# Patient Record
Sex: FEMALE | Race: WHITE | URBAN - METROPOLITAN AREA
[De-identification: names, ages, dates, MRNs, and addresses within clinical notes are randomized per-mention and may not be internally consistent; named-entity substitution may affect disease eponyms.]

---
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
## 2021-09-21 ENCOUNTER — LAB (OUTPATIENT)
Dept: LAB | Facility: HOSPITAL | Age: 21
End: 2021-09-21

## 2021-09-21 ENCOUNTER — OFFICE VISIT (OUTPATIENT)
Dept: FAMILY MEDICINE CLINIC | Facility: CLINIC | Age: 21
End: 2021-09-21

## 2021-09-21 VITALS
HEIGHT: 66 IN | SYSTOLIC BLOOD PRESSURE: 122 MMHG | OXYGEN SATURATION: 99 % | DIASTOLIC BLOOD PRESSURE: 72 MMHG | BODY MASS INDEX: 20.25 KG/M2 | HEART RATE: 82 BPM | WEIGHT: 126 LBS | TEMPERATURE: 98.6 F

## 2021-09-21 DIAGNOSIS — F41.1 ANXIETY STATE: ICD-10-CM

## 2021-09-21 DIAGNOSIS — D64.9 ANEMIA, UNSPECIFIED TYPE: ICD-10-CM

## 2021-09-21 DIAGNOSIS — R07.9 CHEST PAIN, UNSPECIFIED TYPE: ICD-10-CM

## 2021-09-21 DIAGNOSIS — K59.00 CONSTIPATION, UNSPECIFIED CONSTIPATION TYPE: ICD-10-CM

## 2021-09-21 DIAGNOSIS — R10.9 ABDOMINAL CRAMPING: Primary | ICD-10-CM

## 2021-09-21 DIAGNOSIS — Z11.59 NEED FOR HEPATITIS C SCREENING TEST: ICD-10-CM

## 2021-09-21 DIAGNOSIS — F95.2 TOURETTE SYNDROME: ICD-10-CM

## 2021-09-21 DIAGNOSIS — R13.10 DYSPHAGIA, UNSPECIFIED TYPE: ICD-10-CM

## 2021-09-21 DIAGNOSIS — R39.15 URINARY URGENCY: ICD-10-CM

## 2021-09-21 DIAGNOSIS — F42.9 OBSESSIVE-COMPULSIVE DISORDER, UNSPECIFIED TYPE: ICD-10-CM

## 2021-09-21 PROBLEM — Q67.6 PECTUS EXCAVATUM: Status: ACTIVE | Noted: 2020-09-14

## 2021-09-21 PROBLEM — Q79.60 EHLERS-DANLOS SYNDROME: Status: ACTIVE | Noted: 2021-09-21

## 2021-09-21 LAB
ALBUMIN SERPL-MCNC: 4.9 G/DL (ref 3.5–5.2)
ALBUMIN/GLOB SERPL: 2.3 G/DL
ALP SERPL-CCNC: 45 U/L (ref 39–117)
ALT SERPL W P-5'-P-CCNC: 7 U/L (ref 1–33)
ANION GAP SERPL CALCULATED.3IONS-SCNC: 8.1 MMOL/L (ref 5–15)
AST SERPL-CCNC: 13 U/L (ref 1–32)
B-HCG UR QL: NEGATIVE
BASOPHILS # BLD AUTO: 0.02 10*3/MM3 (ref 0–0.2)
BASOPHILS NFR BLD AUTO: 0.3 % (ref 0–1.5)
BILIRUB BLD-MCNC: NEGATIVE MG/DL
BILIRUB SERPL-MCNC: 0.5 MG/DL (ref 0–1.2)
BUN SERPL-MCNC: 6 MG/DL (ref 6–20)
BUN/CREAT SERPL: 15 (ref 7–25)
CALCIUM SPEC-SCNC: 9.3 MG/DL (ref 8.6–10.5)
CHLORIDE SERPL-SCNC: 102 MMOL/L (ref 98–107)
CLARITY, POC: CLEAR
CO2 SERPL-SCNC: 26.9 MMOL/L (ref 22–29)
COLOR UR: YELLOW
CREAT SERPL-MCNC: 0.4 MG/DL (ref 0.57–1)
DEPRECATED RDW RBC AUTO: 39.1 FL (ref 37–54)
EOSINOPHIL # BLD AUTO: 0.03 10*3/MM3 (ref 0–0.4)
EOSINOPHIL NFR BLD AUTO: 0.5 % (ref 0.3–6.2)
ERYTHROCYTE [DISTWIDTH] IN BLOOD BY AUTOMATED COUNT: 11.8 % (ref 12.3–15.4)
GFR SERPL CREATININE-BSD FRML MDRD: >150 ML/MIN/1.73
GLOBULIN UR ELPH-MCNC: 2.1 GM/DL
GLUCOSE SERPL-MCNC: 87 MG/DL (ref 65–99)
GLUCOSE UR STRIP-MCNC: NEGATIVE MG/DL
HCT VFR BLD AUTO: 34.5 % (ref 34–46.6)
HGB BLD-MCNC: 11.8 G/DL (ref 12–15.9)
IMM GRANULOCYTES # BLD AUTO: 0.02 10*3/MM3 (ref 0–0.05)
IMM GRANULOCYTES NFR BLD AUTO: 0.3 % (ref 0–0.5)
INTERNAL NEGATIVE CONTROL: NEGATIVE
INTERNAL POSITIVE CONTROL: POSITIVE
KETONES UR QL: NEGATIVE
LEUKOCYTE EST, POC: NEGATIVE
LYMPHOCYTES # BLD AUTO: 2.59 10*3/MM3 (ref 0.7–3.1)
LYMPHOCYTES NFR BLD AUTO: 43.1 % (ref 19.6–45.3)
Lab: NORMAL
MCH RBC QN AUTO: 31.5 PG (ref 26.6–33)
MCHC RBC AUTO-ENTMCNC: 34.2 G/DL (ref 31.5–35.7)
MCV RBC AUTO: 92 FL (ref 79–97)
MONOCYTES # BLD AUTO: 0.37 10*3/MM3 (ref 0.1–0.9)
MONOCYTES NFR BLD AUTO: 6.2 % (ref 5–12)
NEUTROPHILS NFR BLD AUTO: 2.98 10*3/MM3 (ref 1.7–7)
NEUTROPHILS NFR BLD AUTO: 49.6 % (ref 42.7–76)
NITRITE UR-MCNC: NEGATIVE MG/ML
NRBC BLD AUTO-RTO: 0 /100 WBC (ref 0–0.2)
PH UR: 7 [PH] (ref 5–8)
PLATELET # BLD AUTO: 242 10*3/MM3 (ref 140–450)
PMV BLD AUTO: 11.1 FL (ref 6–12)
POTASSIUM SERPL-SCNC: 4.2 MMOL/L (ref 3.5–5.2)
PROT SERPL-MCNC: 7 G/DL (ref 6–8.5)
PROT UR STRIP-MCNC: NEGATIVE MG/DL
RBC # BLD AUTO: 3.75 10*6/MM3 (ref 3.77–5.28)
RBC # UR STRIP: NEGATIVE /UL
SODIUM SERPL-SCNC: 137 MMOL/L (ref 136–145)
SP GR UR: 1.02 (ref 1–1.03)
TSH SERPL DL<=0.05 MIU/L-ACNC: 1.01 UIU/ML (ref 0.27–4.2)
UROBILINOGEN UR QL: NORMAL
WBC # BLD AUTO: 6.01 10*3/MM3 (ref 3.4–10.8)

## 2021-09-21 PROCEDURE — 83540 ASSAY OF IRON: CPT

## 2021-09-21 PROCEDURE — 84466 ASSAY OF TRANSFERRIN: CPT

## 2021-09-21 PROCEDURE — 82306 VITAMIN D 25 HYDROXY: CPT

## 2021-09-21 PROCEDURE — 86803 HEPATITIS C AB TEST: CPT

## 2021-09-21 PROCEDURE — 81025 URINE PREGNANCY TEST: CPT | Performed by: NURSE PRACTITIONER

## 2021-09-21 PROCEDURE — 84443 ASSAY THYROID STIM HORMONE: CPT

## 2021-09-21 PROCEDURE — 85025 COMPLETE CBC W/AUTO DIFF WBC: CPT

## 2021-09-21 PROCEDURE — 93000 ELECTROCARDIOGRAM COMPLETE: CPT | Performed by: NURSE PRACTITIONER

## 2021-09-21 PROCEDURE — 99204 OFFICE O/P NEW MOD 45 MIN: CPT | Performed by: NURSE PRACTITIONER

## 2021-09-21 PROCEDURE — 81003 URINALYSIS AUTO W/O SCOPE: CPT | Performed by: NURSE PRACTITIONER

## 2021-09-21 PROCEDURE — 80053 COMPREHEN METABOLIC PANEL: CPT

## 2021-09-21 RX ORDER — POLYETHYLENE GLYCOL 3350 17 G/17G
17 POWDER, FOR SOLUTION ORAL DAILY
Qty: 850 G | Refills: 2 | Status: SHIPPED | OUTPATIENT
Start: 2021-09-21 | End: 2021-11-01

## 2021-09-21 NOTE — PROGRESS NOTES
Marilee Cooper presents today to establish care.    Establish Care, Chest Pain (Pt states she gets sharp chest pains about 1-2 times a day.), Abdominal Cramping (Pt states she has had some abdominal cramping for a few weeks.), and Urinary Urgency       HPI   Pt presents today to establish care.  She was getting her care at [facility].  She hasn't been seen in about 1 year.  She went last August, because she pulled a muscle at work.      She has had chest pain since she was a child.  She has been getting a twinge on both sides of her manubrium.  She also gets pain between her shoulder blades.  It isn't everyday or consistent.  It just flares up.  She started noticing it about 3 weeks ago.  It happens regardless of what she is doing.  It lasts for about 30 minutes, but then goes away.  She denies any other symptoms accompanying it.  It does make her feel anxious.  She denies radiation of the pain anywhere else, SOB, sweating.  She has scoliosis and doesn't know if this could be contributing.      She has been having abd cramping for a few weeks.  She has had constipation, bloating, and gas for a while.  She constantly feels like she needs to go to the bathroom.  It gets worse after sex.  She states that online she had read that a tilted pelvis could cause this.  She feels like there is hair in her uterus.  Sometimes it feels like period cramps, but it isn't.  She hasn't taken any medication for this.  She will poop and the cramping will improve a little, but then it comes right back.  She has to strain hard to have a BM.  Sometimes she will wipe and have blood on the tissue.  She hasn't felt anything sticking out of her bottom.  Sometimes it will feel itchy and burning.  Sometimes her stool is dark.  It will be soft, close to diarrhea, but not.  Typically her stool is yellow in color.  Her periods are a little abnormal, but have been consistent.  She had a mix between anorexia and bulimia for a  while.  She will be in the process of eating, but when she starts to swallow she will have trouble.  She will start gagging as she is trying to swallow.  She almost threw up at dinner one time.  She has never had this issue before.  In May is when her eating disorder improved.      Recently she has been having to use the bathroom a lot more.  She sometimes feels like her bladder empties completely, but then sometimes she has to push a bit.  She denies itching, stinging, burning, foul odor, dark urine.     She was diagnosed with Tourette's, OCD, and anxiety when she was 6.  She stopped the medication, her sophomore year of college d/t nausea.  She feels like she is controlling it without medication.  She is able to most of the time calm herself down.    She thinks she has a 1 year f/u for her chest surgery in December.  She had her bars taken out last December.    She doesn't have to see a specialist for her Ehler's Danlos.    She is scheduled for a pap smear in November.     She feels like she might be disassociating for the past month.  It is like she isn't there for short periods of time.  She can't recognize when it happens.  She has no h/o seizures.  She realizes that her brain is going down a thought process.  She sees Santos Marcos in [place].    Past Medical History:   Diagnosis Date   • Anemia    • Anxiety    • Back problem    • Bronchitis    • GERD (gastroesophageal reflux disease)    • Headache    • OCD (obsessive compulsive disorder)    • Pneumonia    • Tourette's    • Tourette's         Past Surgical History:   Procedure Laterality Date   • RECONSTRUCTION PECTUS EXCAVATUM / CARINATUM W/ OR W/O THORASCOPY     • RHINOPLASTY     • TONSILLECTOMY          Family History   Problem Relation Age of Onset   • Arthritis Mother    • Asthma Mother    • Hypothyroidism Mother    • Hypertension Father    • Cancer Maternal Grandfather         Social History     Socioeconomic History   • Marital status: Single     Spouse  "name: Not on file   • Number of children: Not on file   • Years of education: Not on file   • Highest education level: Not on file   Tobacco Use   • Smoking status: Never Smoker   • Smokeless tobacco: Never Used   Substance and Sexual Activity   • Alcohol use: Yes     Comment: social   • Drug use: Yes     Types: Marijuana     Comment: 1-2/day        Current Outpatient Medications on File Prior to Visit   Medication Sig Dispense Refill   • albuterol sulfate  (90 Base) MCG/ACT inhaler Inhale 2 puffs Every 6 (Six) Hours As Needed for Wheezing or Shortness of Air. 1 inhaler 0     No current facility-administered medications on file prior to visit.       Allergies   Allergen Reactions   • Metronidazole Nausea Only   • Augmentin [Amoxicillin-Pot Clavulanate] Rash        Vitals:    09/21/21 1421   BP: 122/72   Pulse: 82   Temp: 98.6 °F (37 °C)   SpO2: 99%   Weight: 57.2 kg (126 lb)   Height: 167.6 cm (66\")   PainSc:   5          Physical Exam  Vitals reviewed.   Constitutional:       Appearance: Normal appearance. She is normal weight.   HENT:      Head: Normocephalic and atraumatic.      Right Ear: Tympanic membrane, ear canal and external ear normal.      Left Ear: Tympanic membrane, ear canal and external ear normal.      Nose: Nose normal.      Mouth/Throat:      Mouth: Mucous membranes are moist.      Pharynx: Oropharynx is clear.   Cardiovascular:      Rate and Rhythm: Normal rate and regular rhythm.      Heart sounds: Normal heart sounds.   Pulmonary:      Effort: Pulmonary effort is normal.      Breath sounds: Normal breath sounds.   Abdominal:      General: Bowel sounds are normal.      Palpations: Abdomen is soft.      Tenderness: There is abdominal tenderness. There is no guarding or rebound.   Musculoskeletal:      Cervical back: Neck supple.   Skin:     General: Skin is warm and dry.   Neurological:      General: No focal deficit present.      Mental Status: She is alert and oriented to person, place, " and time.   Psychiatric:         Mood and Affect: Mood normal.         Behavior: Behavior normal.         Thought Content: Thought content normal.         Judgment: Judgment normal.         ECG 12 Lead    Date/Time: 9/21/2021 2:30 PM  Performed by: Kinsey Gonzalez APRN  Authorized by: Kinsey Gonzalez APRN   Comparison: not compared with previous ECG   Rhythm: sinus bradycardia  Rate: bradycardic  Conduction: conduction normal  ST Segments: ST segments normal  T Waves: T waves normal  QRS axis: normal    Clinical impression: normal ECG          Diagnoses and all orders for this visit:    1. Abdominal cramping (Primary) -likely constipation related.  Her urinalysis and urine pregnancy were negative.  -     POC Pregnancy, Urine    2. Urinary urgency -urinalysis is negative.  This could be related to constipation.  -     POC Urinalysis Dipstick, Automated    3. Anxiety state -she has a history of anxiety and was taking medication until about a year ago.  She states that currently she has been handling this well on her own.  She has been having episodes where she does zone out.  --Referral to behavioral health.  -     Ambulatory Referral to Behavioral Health  -     Vitamin D 25 Hydroxy; Future    4. Obsessive-compulsive disorder, unspecified type -she has a history of OCD which she was taking medication for previously.  She feels like she is handling this well at present.  --Referral placed to behavioral health.  -     Ambulatory Referral to Behavioral Health    5. Tourette syndrome -stable.  -     Ambulatory Referral to Behavioral Health    6. Dysphagia, unspecified type -this could be related to GERD, anxiety, dysmotility.  Patient states that she has always had issues with GERD.  Discussed with her that she can take over-the-counter Pepcid 20 mg twice a day.  --Order placed for barium swallow.  -     FL Esophagram Complete Single Contrast; Future    7. Chest pain, unspecified type -EKG is normal.  She does have a  history of pectus excavatum for which she had surgery.  It is likely that her pain could be scar tissue related.  --Referral placed to cardiology for further evaluation.  -     Ambulatory Referral to Cardiology  -     CBC & Differential; Future  -     Comprehensive Metabolic Panel; Future    8. Constipation, unspecified constipation type -her abdominal issues and urinary issues are likely related to constipation.  --Encouraged increased fiber intake.  --Start MiraLAX one capful daily.  --Ensure adequate fluid intake.  --If symptoms worsen or do not improve, return to clinic.  -     polyethylene glycol (MiraLax) 17 GM/SCOOP powder; Take 17 g by mouth Daily.  Dispense: 850 g; Refill: 2  -     TSH Rfx On Abnormal To Free T4; Future    9. Need for hepatitis C screening test  -     Hepatitis C Antibody; Future        Return in about 4 weeks (around 10/19/2021) for Follow-up.    Kinsey Gonzalez, APRN

## 2021-09-22 DIAGNOSIS — D64.9 ANEMIA, UNSPECIFIED TYPE: Primary | ICD-10-CM

## 2021-09-22 LAB
25(OH)D3 SERPL-MCNC: 34.2 NG/ML (ref 30–100)
HCV AB SER DONR QL: NORMAL
IRON 24H UR-MRATE: 137 MCG/DL (ref 37–145)
IRON SATN MFR SERPL: 33 % (ref 20–50)
TIBC SERPL-MCNC: 411 MCG/DL (ref 298–536)
TRANSFERRIN SERPL-MCNC: 276 MG/DL (ref 200–360)

## 2021-10-01 ENCOUNTER — HOSPITAL ENCOUNTER (OUTPATIENT)
Dept: GENERAL RADIOLOGY | Facility: HOSPITAL | Age: 21
Discharge: HOME | End: 2021-10-01

## 2021-10-01 DIAGNOSIS — R13.10 DYSPHAGIA, UNSPECIFIED TYPE: ICD-10-CM

## 2021-10-13 ENCOUNTER — HOSPITAL ENCOUNTER (OUTPATIENT)
Dept: GENERAL RADIOLOGY | Facility: HOSPITAL | Age: 21
Discharge: HOME | End: 2021-10-13
Admitting: NURSE PRACTITIONER

## 2021-10-13 PROCEDURE — 74220 X-RAY XM ESOPHAGUS 1CNTRST: CPT

## 2021-10-13 RX ADMIN — BARIUM SULFATE 183 ML: 960 POWDER, FOR SUSPENSION ORAL at 11:27

## 2021-10-19 ENCOUNTER — OFFICE VISIT (OUTPATIENT)
Dept: FAMILY MEDICINE CLINIC | Facility: CLINIC | Age: 21
End: 2021-10-19

## 2021-10-19 VITALS
TEMPERATURE: 97.1 F | BODY MASS INDEX: 20.73 KG/M2 | HEART RATE: 61 BPM | HEIGHT: 66 IN | WEIGHT: 129 LBS | OXYGEN SATURATION: 99 % | DIASTOLIC BLOOD PRESSURE: 80 MMHG | SYSTOLIC BLOOD PRESSURE: 130 MMHG

## 2021-10-19 DIAGNOSIS — Z23 IMMUNIZATION DUE: ICD-10-CM

## 2021-10-19 DIAGNOSIS — F41.1 ANXIETY STATE: Primary | ICD-10-CM

## 2021-10-19 DIAGNOSIS — R23.3 EASY BRUISING: ICD-10-CM

## 2021-10-19 DIAGNOSIS — Z01.20 DENTAL EXAMINATION: ICD-10-CM

## 2021-10-19 DIAGNOSIS — K59.00 CONSTIPATION, UNSPECIFIED CONSTIPATION TYPE: ICD-10-CM

## 2021-10-19 PROCEDURE — 99214 OFFICE O/P EST MOD 30 MIN: CPT | Performed by: NURSE PRACTITIONER

## 2021-10-19 PROCEDURE — 90686 IIV4 VACC NO PRSV 0.5 ML IM: CPT | Performed by: NURSE PRACTITIONER

## 2021-10-19 PROCEDURE — 90471 IMMUNIZATION ADMIN: CPT | Performed by: NURSE PRACTITIONER

## 2021-10-19 NOTE — PROGRESS NOTES
Chief Complaint  Abdominal Pain (4 week follow up, she reports that cramping has lessened and she has been moving her bowels regularly )    Evgeny Cooper presents to [facility]     Abdominal Pain  This is a chronic problem. The current episode started more than 1 year ago. The onset quality is gradual. The problem occurs daily. The most recent episode lasted 5 hours. The problem has been gradually worsening. The pain is located in the generalized abdominal region. The pain is at a severity of 6/10. The quality of the pain is aching, cramping, dull and a sensation of fullness. The abdominal pain radiates to the chest, back, pelvis and perineum. Associated symptoms include arthralgias, belching, constipation, diarrhea, flatus, frequency, headaches, melena, myalgias and nausea. Pertinent negatives include no anorexia, dysuria, fever, hematochezia, hematuria, vomiting or weight loss. The pain is aggravated by bowel movement and drinking alcohol. The pain is relieved by bowel movements, liquids, movement and passing flatus.     Answers for HPI/ROS submitted by the patient on 10/19/2021  What is the primary reason for your visit?: Abdominal Pain    Her bowel movements are more consistent.  She is having them daily.  She is having less cramping.  She is going multiple times per day.  She is still sometimes feeling like she is getting everything out.  She will occasionally have runny/loose stool.      She is still experiencing some chest and back pain.  She has seen the chiropractor and seen some improvement.  She still has a cardiology appt on 11/1.  She has had improvement in her upper back pain with the chiropractor.      Her mood has been up and down.  It has been difficult for her to control her emotions lately.  Her anxiety has been pretty high.  She has been trying to control this.  She has a Behavioral Health appt on 10/26.  There were a few times that she felt  "like she was over reacting.      She bruises easily.  She will wake up with random bruises all over her legs.  She can't remember doing anything that would cause this.    Objective   Vital Signs:   /80   Pulse 61   Temp 97.1 °F (36.2 °C)   Ht 167.6 cm (66\")   Wt 58.5 kg (129 lb)   SpO2 99%   BMI 20.82 kg/m²       Physical Exam  Vitals reviewed.   Constitutional:       Appearance: Normal appearance.   HENT:      Head: Normocephalic and atraumatic.   Cardiovascular:      Rate and Rhythm: Normal rate and regular rhythm.      Heart sounds: Normal heart sounds.   Pulmonary:      Effort: Pulmonary effort is normal.      Breath sounds: Normal breath sounds.   Skin:     General: Skin is warm and dry.   Neurological:      General: No focal deficit present.      Mental Status: She is alert and oriented to person, place, and time.   Psychiatric:         Mood and Affect: Mood normal.         Behavior: Behavior normal.         Thought Content: Thought content normal.         Judgment: Judgment normal.        Result Review :                 Assessment and Plan    Diagnoses and all orders for this visit:    1. Anxiety state (Primary) -pt does feel like this is a significant issue for her.  She does not know what kind of treatment she would like to try.  Discussed daily medication and PRN medications.  --Pt would like to think about treatment options and she will let provider know what she decides to do.  --Keep appt with Behavioral Health.    2. Constipation, unspecified constipation type -improved.    3. Easy bruising -likely d/t Michaela Danlos.  Platelet count on blood work was normal.    4. Dental examination -she would like a referral to dentistry.  -     Ambulatory Referral to Dentistry    5. Immunization due  -     FluLaval/Fluarix/Fluzone >6 Months ([id number])        Follow Up   Return if symptoms worsen or fail to improve.  Patient was given instructions and counseling regarding her condition or for health " maintenance advice. Please see specific information pulled into the AVS if appropriate.

## 2021-10-26 ENCOUNTER — TELEMEDICINE (OUTPATIENT)
Dept: PSYCHIATRY | Facility: CLINIC | Age: 21
End: 2021-10-26

## 2021-10-26 DIAGNOSIS — F33.1 MAJOR DEPRESSIVE DISORDER, RECURRENT EPISODE, MODERATE (HCC): ICD-10-CM

## 2021-10-26 DIAGNOSIS — F41.1 GENERALIZED ANXIETY DISORDER: Primary | ICD-10-CM

## 2021-10-26 PROCEDURE — 90791 PSYCH DIAGNOSTIC EVALUATION: CPT | Performed by: SOCIAL WORKER
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
## 2021-11-01 ENCOUNTER — OFFICE VISIT (OUTPATIENT)
Dept: CARDIOLOGY | Facility: CLINIC | Age: 21
End: 2021-11-01

## 2021-11-01 VITALS
OXYGEN SATURATION: 98 % | HEART RATE: 68 BPM | WEIGHT: 133 LBS | HEIGHT: 66 IN | DIASTOLIC BLOOD PRESSURE: 60 MMHG | SYSTOLIC BLOOD PRESSURE: 98 MMHG | BODY MASS INDEX: 21.38 KG/M2

## 2021-11-01 DIAGNOSIS — R07.2 PRECORDIAL CHEST PAIN: Primary | ICD-10-CM

## 2021-11-01 DIAGNOSIS — R07.2 PRECORDIAL CHEST PAIN: ICD-10-CM

## 2021-11-01 DIAGNOSIS — Q67.6 PECTUS EXCAVATUM: ICD-10-CM

## 2021-11-01 DIAGNOSIS — Q79.60 EHLERS-DANLOS SYNDROME: ICD-10-CM

## 2021-11-01 DIAGNOSIS — Q79.60 EHLERS-DANLOS SYNDROME: Primary | ICD-10-CM

## 2021-11-01 PROCEDURE — 99203 OFFICE O/P NEW LOW 30 MIN: CPT | Performed by: INTERNAL MEDICINE

## 2021-11-01 PROCEDURE — 93000 ELECTROCARDIOGRAM COMPLETE: CPT | Performed by: INTERNAL MEDICINE

## 2021-11-01 RX ORDER — IBUPROFEN 200 MG
400 TABLET ORAL EVERY 6 HOURS PRN
COMMUNITY
End: 2022-10-17

## 2021-11-01 NOTE — PROGRESS NOTES
"Subjective:     Encounter Date:11/01/2021    Primary Care Physician: Kinsey Gonzalez APRN      Patient ID: Marilee Cooper is a 21 y.o. female.    Chief Complaint:Chest Pain, Shortness of Breath, and Dizziness    PROBLEM LIST:  1. Chest pain  2. Pectus excavatum  3. Erler's Danlos syndrome  4. Anxiety/depression  5. GERD  6. Irritable bowel syndrome  7. OCD  8. Tourette's  9. Marijuana use  10. Bilateral bunions  11. Surgeries:  a. GENESIS procedure 2016  b. GENESIS bar extraction 12/2020  c. Rhinoplasty  d. Tonsillectomy      Allergies   Allergen Reactions   • Metronidazole Nausea Only   • Augmentin [Amoxicillin-Pot Clavulanate] Rash         Current Outpatient Medications:   •  ibuprofen (ADVIL,MOTRIN) 200 MG tablet, Take 400 mg by mouth Every 6 (Six) Hours As Needed for Mild Pain ., Disp: , Rfl:         History of Present Illness    Patient is a 21-year-old  female who is being seen today for further evaluation of chest pain.  She has previous history of pectus excavatum status post previous surgical intervention.  In December of last year she had her bars removed.  Patient describes intermittent recurrent sharp midsternal chest discomfort.  This can last anywhere from 30 minutes to an hour.  Notices itself and sometimes more whenever she is smoking.  Occasionally does happen with exertion.  No reported syncope, near-syncope, or edema.  No significant dyspnea.  Does note some intermittent sternal popping.  Has also noted an intermittent \"knocking sound\" in her chest.  Due to her symptoms she was referred here for further evaluation.    The following portions of the patient's history were reviewed and updated as appropriate: allergies, current medications, past family history, past medical history, past social history, past surgical history and problem list.    Family History   Problem Relation Age of Onset   • Arthritis Mother    • Asthma Mother    • Hypothyroidism Mother    • Depression Mother    • " "Mental illness Mother         Bipolar/anxiety/ocd/adhd   • Thyroid disease Mother         Underactive thyroid   • Hypertension Father    • Cancer Maternal Grandfather    • Early death Maternal Grandfather    • Depression Sister    • Mental illness Sister         BPD/Bipolar/OCD/Anxiety/adhd   • Developmental Disability Paternal Uncle         Downs Syndrome   • Miscarriages / Stillbirths Maternal Grandmother                Social History     Tobacco Use   • Smoking status: Passive Smoke Exposure - Never Smoker   • Smokeless tobacco: Never Used   • Tobacco comment: smokes e cig occas   Vaping Use   • Vaping Use: Some days   Substance Use Topics   • Alcohol use: Yes     Alcohol/week: 4.0 standard drinks     Types: 4 Glasses of wine per week     Comment: social   • Drug use: Yes     Frequency: 7.0 times per week     Types: Marijuana     Comment: 1-2/day         Review of Systems   Constitutional: Positive for malaise/fatigue. Negative for fever.   HENT: Negative for nosebleeds.    Eyes: Negative for redness and visual disturbance.   Cardiovascular: Positive for chest pain. Negative for orthopnea, palpitations and paroxysmal nocturnal dyspnea.   Respiratory: Negative for cough, snoring, sputum production and wheezing.    Hematologic/Lymphatic: Negative for bleeding problem.   Skin: Negative for flushing, itching and rash.   Musculoskeletal: Positive for joint pain. Negative for falls and muscle cramps.   Gastrointestinal: Negative for abdominal pain, diarrhea, heartburn, nausea and vomiting.   Genitourinary: Negative for hematuria.   Neurological: Negative for excessive daytime sleepiness, dizziness, headaches, tremors and weakness.   Psychiatric/Behavioral: Positive for depression. Negative for substance abuse. The patient is nervous/anxious.           Objective:   BP 98/60 (BP Location: Right arm, Patient Position: Sitting)   Pulse 68   Ht 167.6 cm (66\")   Wt 60.3 kg (133 lb)   SpO2 98%   BMI 21.47 kg/m² "         Vitals reviewed.   Constitutional:       Appearance: Healthy appearance. Well-developed and not in distress.   Eyes:      Conjunctiva/sclera: Conjunctivae normal.      Pupils: Pupils are equal, round, and reactive to light.   HENT:      Head: Normocephalic and atraumatic.    Mouth/Throat:      Pharynx: Oropharynx is clear.   Neck:      Thyroid: Thyroid normal. No thyromegaly.      Vascular: Normal carotid pulses. No carotid bruit or JVD. JVD normal.      Lymphadenopathy: No cervical adenopathy.   Pulmonary:      Effort: No respiratory distress.      Breath sounds: No wheezing. No rales.   Chest:      Chest wall: Not tender to palpatation.   Cardiovascular:      Normal rate. Regular rhythm.      No gallop.   Pulses:     Carotid: 2+ bilaterally.     Dorsalis pedis: 2+ bilaterally.     Posterior tibial: 2+ bilaterally.  Abdominal:      General: There is no distension or abdominal bruit.      Palpations: There is no abdominal mass.      Tenderness: There is no abdominal tenderness. There is no rebound.   Musculoskeletal:         General: No tenderness or deformity.      Extremities: No clubbing present.Skin:     General: Skin is warm and dry. There is no cyanosis.      Findings: No rash.   Neurological:      Mental Status: Alert, oriented to person, place, and time and oriented to person, place and time.           ECG 12 Lead    Date/Time: 11/1/2021 4:40 PM  Performed by: Abimael Bass MD  Authorized by: Abimael Bass MD   Comparison: compared with previous ECG from 9/21/2021  Similar to previous ECG  Rhythm: sinus rhythm    Clinical impression: normal ECG                  Assessment:   Assessment/Plan      Diagnoses and all orders for this visit:    1. Precordial chest pain (Primary)  -     ECG 12 Lead    2. Michaela-Danlos syndrome      1.  Precordial chest pain, suspect musculoskeletal due to previous Abdulkadir procedure Due to previous pectus excavatum.  2.  Michaela Danlos syndrome    Recommendations:  1.   Long discussion with patient today regarding nature of her chest pain.  Suspect it is due to her previous mandie placement from her Abdulkadir procedure and subsequent costochondritis, and posterior rib/vertebral pain.  Likely worse than usual due to her late nature of her operative/curative procedure.  2.  Nonsteroidals as needed  3.  We will check CT scan of chest to rule out any structural abnormality from the above  4.  Echocardiogram to evaluate LVEF/mitral valve prolapse with her collagen vascular disease.  5.  Further recommendations after the above  Kady BLACK scribed portions of this dictation for Dr. Abimael Bass.   I have seen and examined the patient, I have reviewed the note, discussed the case with the advance practice clinician, made necessary changes and I agree with the final note.    Abimael Bass MD  11/01/21  17:20 EDT        Dictated utilizing Dragon dictation

## 2021-12-12 ENCOUNTER — HOSPITAL ENCOUNTER (OUTPATIENT)
Dept: CARDIOLOGY | Facility: HOSPITAL | Age: 21
Discharge: HOME | End: 2021-12-12

## 2021-12-12 ENCOUNTER — HOSPITAL ENCOUNTER (OUTPATIENT)
Dept: CT IMAGING | Facility: HOSPITAL | Age: 21
Discharge: HOME | End: 2021-12-12

## 2021-12-12 VITALS — BODY MASS INDEX: 20.88 KG/M2 | WEIGHT: 133 LBS | HEIGHT: 67 IN

## 2021-12-12 DIAGNOSIS — R07.2 PRECORDIAL CHEST PAIN: ICD-10-CM

## 2021-12-12 DIAGNOSIS — Q67.6 PECTUS EXCAVATUM: ICD-10-CM

## 2021-12-12 DIAGNOSIS — Q79.60 EHLERS-DANLOS SYNDROME: ICD-10-CM

## 2021-12-12 PROCEDURE — 93306 TTE W/DOPPLER COMPLETE: CPT | Performed by: INTERNAL MEDICINE

## 2021-12-12 PROCEDURE — 71270 CT THORAX DX C-/C+: CPT

## 2021-12-12 PROCEDURE — 25010000002 IOPAMIDOL 61 % SOLUTION: Performed by: INTERNAL MEDICINE

## 2021-12-12 PROCEDURE — 93306 TTE W/DOPPLER COMPLETE: CPT

## 2021-12-12 RX ADMIN — IOPAMIDOL 75 ML: 612 INJECTION, SOLUTION INTRAVENOUS at 16:24

## 2021-12-13 LAB
ASCENDING AORTA: 2.5 CM
BH CV ECHO MEAS - AO MAX PG (FULL): 4.4 MMHG
BH CV ECHO MEAS - AO MAX PG: 6 MMHG
BH CV ECHO MEAS - AO MEAN PG (FULL): 2.9 MMHG
BH CV ECHO MEAS - AO MEAN PG: 3.7 MMHG
BH CV ECHO MEAS - AO ROOT AREA (BSA CORRECTED): 1.6
BH CV ECHO MEAS - AO ROOT AREA: 6 CM^2
BH CV ECHO MEAS - AO ROOT DIAM: 2.8 CM
BH CV ECHO MEAS - AO V2 MAX: 123.3 CM/SEC
BH CV ECHO MEAS - AO V2 MEAN: 91.7 CM/SEC
BH CV ECHO MEAS - AO V2 VTI: 27.8 CM
BH CV ECHO MEAS - ASC AORTA: 2.5 CM
BH CV ECHO MEAS - AVA(I,A): 1.4 CM^2
BH CV ECHO MEAS - AVA(I,D): 1.4 CM^2
BH CV ECHO MEAS - AVA(V,A): 1.5 CM^2
BH CV ECHO MEAS - AVA(V,D): 1.5 CM^2
BH CV ECHO MEAS - BSA(HAYCOCK): 1.7 M^2
BH CV ECHO MEAS - BSA: 1.7 M^2
BH CV ECHO MEAS - BZI_BMI: 21.5 KILOGRAMS/M^2
BH CV ECHO MEAS - BZI_METRIC_HEIGHT: 167.6 CM
BH CV ECHO MEAS - BZI_METRIC_WEIGHT: 60.3 KG
BH CV ECHO MEAS - EDV(CUBED): 99.4 ML
BH CV ECHO MEAS - EDV(MOD-SP2): 93.7 ML
BH CV ECHO MEAS - EDV(MOD-SP4): 103 ML
BH CV ECHO MEAS - EDV(TEICH): 98.9 ML
BH CV ECHO MEAS - EF(CUBED): 67.9 %
BH CV ECHO MEAS - EF(MOD-BP): 58.4 %
BH CV ECHO MEAS - EF(MOD-SP2): 60.5 %
BH CV ECHO MEAS - EF(MOD-SP4): 55.3 %
BH CV ECHO MEAS - EF(TEICH): 59.5 %
BH CV ECHO MEAS - ESV(CUBED): 31.9 ML
BH CV ECHO MEAS - ESV(MOD-SP2): 37 ML
BH CV ECHO MEAS - ESV(MOD-SP4): 46 ML
BH CV ECHO MEAS - ESV(TEICH): 40.1 ML
BH CV ECHO MEAS - FS: 31.5 %
BH CV ECHO MEAS - IVS/LVPW: 0.67
BH CV ECHO MEAS - IVSD: 0.52 CM
BH CV ECHO MEAS - LA DIMENSION: 2.6 CM
BH CV ECHO MEAS - LA/AO: 0.95
BH CV ECHO MEAS - LAD MAJOR: 4.6 CM
BH CV ECHO MEAS - LAT PEAK E' VEL: 17.3 CM/SEC
BH CV ECHO MEAS - LATERAL E/E' RATIO: 6.7
BH CV ECHO MEAS - LV DIASTOLIC VOL/BSA (35-75): 61.2 ML/M^2
BH CV ECHO MEAS - LV IVRT: 0.07 SEC
BH CV ECHO MEAS - LV MASS(C)D: 92.2 GRAMS
BH CV ECHO MEAS - LV MASS(C)DI: 54.8 GRAMS/M^2
BH CV ECHO MEAS - LV MAX PG: 1.6 MMHG
BH CV ECHO MEAS - LV MEAN PG: 0.75 MMHG
BH CV ECHO MEAS - LV SYSTOLIC VOL/BSA (12-30): 27.4 ML/M^2
BH CV ECHO MEAS - LV V1 MAX: 63.7 CM/SEC
BH CV ECHO MEAS - LV V1 MEAN: 39.7 CM/SEC
BH CV ECHO MEAS - LV V1 VTI: 12.6 CM
BH CV ECHO MEAS - LVIDD: 4.6 CM
BH CV ECHO MEAS - LVIDS: 3.2 CM
BH CV ECHO MEAS - LVLD AP2: 8.1 CM
BH CV ECHO MEAS - LVLD AP4: 8.4 CM
BH CV ECHO MEAS - LVLS AP2: 7 CM
BH CV ECHO MEAS - LVLS AP4: 7.2 CM
BH CV ECHO MEAS - LVOT AREA (M): 3.1 CM^2
BH CV ECHO MEAS - LVOT AREA: 3 CM^2
BH CV ECHO MEAS - LVOT DIAM: 2 CM
BH CV ECHO MEAS - LVPWD: 0.79 CM
BH CV ECHO MEAS - MED PEAK E' VEL: 14.2 CM/SEC
BH CV ECHO MEAS - MEDIAL E/E' RATIO: 8.2
BH CV ECHO MEAS - MV A MAX VEL: 34.9 CM/SEC
BH CV ECHO MEAS - MV DEC SLOPE: 386.2 CM/SEC^2
BH CV ECHO MEAS - MV DEC TIME: 0.18 SEC
BH CV ECHO MEAS - MV E MAX VEL: 116 CM/SEC
BH CV ECHO MEAS - MV E/A: 3.3
BH CV ECHO MEAS - MV MAX PG: 4.7 MMHG
BH CV ECHO MEAS - MV MEAN PG: 1.1 MMHG
BH CV ECHO MEAS - MV P1/2T MAX VEL: 116.3 CM/SEC
BH CV ECHO MEAS - MV P1/2T: 88.2 MSEC
BH CV ECHO MEAS - MV V2 MAX: 108.6 CM/SEC
BH CV ECHO MEAS - MV V2 MEAN: 45.6 CM/SEC
BH CV ECHO MEAS - MV V2 VTI: 32.2 CM
BH CV ECHO MEAS - MVA P1/2T LCG: 1.9 CM^2
BH CV ECHO MEAS - MVA(P1/2T): 2.5 CM^2
BH CV ECHO MEAS - MVA(VTI): 1.2 CM^2
BH CV ECHO MEAS - PA ACC TIME: 0.14 SEC
BH CV ECHO MEAS - PA MAX PG: 4.1 MMHG
BH CV ECHO MEAS - PA PR(ACCEL): 14.8 MMHG
BH CV ECHO MEAS - PA V2 MAX: 100.8 CM/SEC
BH CV ECHO MEAS - RAP SYSTOLE: 3 MMHG
BH CV ECHO MEAS - RVSP: 22 MMHG
BH CV ECHO MEAS - SI(AO): 99.6 ML/M^2
BH CV ECHO MEAS - SI(CUBED): 40.1 ML/M^2
BH CV ECHO MEAS - SI(LVOT): 22.4 ML/M^2
BH CV ECHO MEAS - SI(MOD-SP2): 33.7 ML/M^2
BH CV ECHO MEAS - SI(MOD-SP4): 33.9 ML/M^2
BH CV ECHO MEAS - SI(TEICH): 35 ML/M^2
BH CV ECHO MEAS - SV(AO): 167.5 ML
BH CV ECHO MEAS - SV(CUBED): 67.5 ML
BH CV ECHO MEAS - SV(LVOT): 37.7 ML
BH CV ECHO MEAS - SV(MOD-SP2): 56.7 ML
BH CV ECHO MEAS - SV(MOD-SP4): 57 ML
BH CV ECHO MEAS - SV(TEICH): 58.8 ML
BH CV ECHO MEAS - TAPSE (>1.6): 2.1 CM
BH CV ECHO MEAS - TR MAX PG: 19 MMHG
BH CV ECHO MEAS - TR MAX VEL: 216 CM/SEC
BH CV ECHO MEASUREMENTS AVERAGE E/E' RATIO: 7.37
BH CV VAS BP RIGHT ARM: NORMAL MMHG
BH CV XLRA - RV BASE: 3.4 CM
BH CV XLRA - RV LENGTH: 6.4 CM
BH CV XLRA - RV MID: 2.3 CM
BH CV XLRA - TDI S': 14.7 CM/SEC
IVRT: 74 MSEC
LEFT ATRIUM VOLUME INDEX: 14.3 ML/M^2
LEFT ATRIUM VOLUME: 24.1 ML
LV EF 2D ECHO EST: 55 %

## 2021-12-14 ENCOUNTER — TELEPHONE (OUTPATIENT)
Dept: CARDIOLOGY | Facility: CLINIC | Age: 21
End: 2021-12-14

## 2021-12-14 NOTE — TELEPHONE ENCOUNTER
Spoke with patient, advised of below.    ----- Message from Abimael Bass MD sent at 12/13/2021  1:07 PM EST -----  Echo and CT scan both normal

## 2021-12-23 ENCOUNTER — TELEMEDICINE (OUTPATIENT)
Dept: PSYCHIATRY | Facility: CLINIC | Age: 21
End: 2021-12-23

## 2021-12-23 DIAGNOSIS — F43.10 POST TRAUMATIC STRESS DISORDER (PTSD): Primary | ICD-10-CM

## 2021-12-23 PROCEDURE — 90834 PSYTX W PT 45 MINUTES: CPT | Performed by: SOCIAL WORKER
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
## 2022-01-05 ENCOUNTER — TELEMEDICINE (OUTPATIENT)
Dept: PSYCHIATRY | Facility: CLINIC | Age: 22
End: 2022-01-05

## 2022-01-05 DIAGNOSIS — F43.10 POST TRAUMATIC STRESS DISORDER (PTSD): Primary | ICD-10-CM

## 2022-01-05 PROCEDURE — 90837 PSYTX W PT 60 MINUTES: CPT | Performed by: SOCIAL WORKER

## 2022-01-05 NOTE — PROGRESS NOTES
[facility]   Follow-up Progress Note     Date: January 18, 2022  Time In: 1:07PM  Time Out: 2:01 PM      PROGRESS NOTE  Data:  Marilee Cooper is a 21 y.o. female presenting to [facility] for follow-up with Dahiana Villela LCSW. The patient is seen remotely using [facility] My Chart. Patient is being seen via telehealth and stated they are in a secure environment for this session. The patient's condition being diagnosed/treated is appropriate for telemedicine. The provider identified herself as well as her credentials. The patient and/or patients guardian consent to be seen remotely, and when consent is given they understand that the consent allows for patient identifiable information to be sent to a third party as needed. They may refuse to be seen remotely at any time. The electronic data is encrypted and password protected, and the patient has been advised of the potential risks to privacy not withstanding such measures.    Today Patient presented for follow up.  Patient checked in with how things have been going.  She stated she has been applying to grad programs, she identified feeling of anxiety and fear related to this.  Patient explored her plans for this.  Patient identified stressors in her relationship and how to navigate this.  Patient and provider discussed plan for beginning cognitive processing therapy.    Clinical Maneuvering/Intervention: CBT    Assisted Patient in processing above session content; acknowledged and normalized patient’s thoughts, feelings, and concerns.  Rationalized patient thought process regarding future plans.  Discussed triggers associated with patient's anxiety and PTSD.  Also discussed coping skills for patient to implement such as cognitive reprocessing.    Allowed Patient to freely discuss issues  without interruption or judgement with unconditional positive regard, active listening skills,  and empathy. Therapist provided a safe, confidential environment to facilitate the development of a positive therapeutic relationship and encouraged open, honest communication. Assisted Patient in identifying risk factors which would indicate the need for higher level of care including thoughts to harm self or others and/or self-harming behavior and encouraged Patient to contact this office, call 911, or present to the nearest emergency room should any of these events occur. Discussed crisis intervention services and means to access. Patient adamantly and convincingly denies current suicidal or homicidal ideation or perceptual disturbance. Assisted Patient in processing session content; acknowledged and normalized Patient’s thoughts, feelings, and concerns by utilizing a person-centered approach in efforts to build appropriate rapport and a positive therapeutic relationship with open and honest communication. Therapist utilized dialectical behavior techniques to teach and model emotional regulation and relaxation methods. Therapist assisted Patient with identifying and implementing healthier coping strategies.     Assessment   Patient appears to be maintaining relative stability as compared to baseline.  Patient continues to struggle with anxiety and PTSD.   As a result, they can be reasonably expected to continue to benefit from treatment and would likely be at increased risk for decompensation otherwise.    Mental Status Exam:   Hygiene:   good  Cooperation:  Cooperative  Eye Contact:  Good  Psychomotor Behavior:  Appropriate  Affect:  Appropriate  Mood: normal  Speech:  Normal  Thought Process:  Goal directed  Thought Content:  Normal  Suicidal:  None  Homicidal:  None  Hallucinations:  None  Delusion:  None  Memory:  Intact  Orientation:  Person, Place, Time and Situation  Reliability:  good  Insight:  Good  Judgement:  Good  Impulse Control:  Good  Physical/Medical Issues:  No      PHQ-Score Total:  PHQ-9 Total  Score: (P) 17    ADAM-7:  Feeling nervous, anxious or on edge: (P) More than half the days  Not being able to stop or control worrying: (P) More than half the days  Worrying too much about different things: (P) More than half the days  Trouble Relaxing: (P) More than half the days  Being so restless that it is hard to sit still: (P) Several days  Feeling afraid as if something awful might happen: (P) More than half the days  Becoming easily annoyed or irritable: (P) More than half the days  ADAM 7 Total Score: (P) 13  If you checked any problems, how difficult have these problems made it for you to do your work, take care of things at home, or get along with other people: (P) Very difficult    Patient's Support Network Includes:  significant other and parents    Functional Status: Moderate impairment     Progress toward goal: Not at goal    Prognosis: Good with Ongoing Treatment            Impression/Formulation:    VISIT DIAGNOSIS:     ICD-10-CM ICD-9-CM   1. Post traumatic stress disorder (PTSD)  F43.10 309.81        Patient appeared alert and oriented.  Patient is voluntarily requesting to continue outpatient therapy at [facility].  Patient is receptive to assistance with maintaining a stable lifestyle.  Patient presents with history of anxiety and PTSD.  Patient is agreeable to attend routine therapy sessions.  Patient expressed desire to maintain stability and participate in the therapeutic process.        Crisis Plan:  If symptoms/behaviors persist, Patient will present to the nearest hospital for an assessment. Advised patient of [facility] and assessment services.     Plan:   Patient will continue in individual outpatient therapy with focus on improved functioning and coping skills, maintaining stability, and avoiding decompensation and the need for higher level of care.    Patient will contact this office, call 911 or present to the nearest emergency room should  suicidal or homicidal ideations occur. Provide Cognitive Behavioral Therapy and Solution Focused Therapy to improve functioning, maintain stability, and avoid decompensation and the need for higher level of care.     Return in about 1 weeks, or earlier if symptoms worsen or fail to improve.    Recommended Referrals: None        This document has been electronically signed by Dahiana Villela LCSW  January 18, 2022 09:22 EST        Part of this note may be an electronic transcription/translation of spoken language to printed text using the Dragon Dictation System.

## 2022-01-19 ENCOUNTER — TELEMEDICINE (OUTPATIENT)
Dept: PSYCHIATRY | Facility: CLINIC | Age: 22
End: 2022-01-19

## 2022-01-19 DIAGNOSIS — F43.10 POST TRAUMATIC STRESS DISORDER (PTSD): Primary | ICD-10-CM

## 2022-01-19 PROCEDURE — 90837 PSYTX W PT 60 MINUTES: CPT | Performed by: SOCIAL WORKER

## 2022-01-19 NOTE — PROGRESS NOTES
[facility]   Follow-up Progress Note     Date: January 30, 2022  Time In: 1:00PM  Time Out: 2:00 PM      PROGRESS NOTE  Data:  Marilee Cooper is a 21 y.o. female presenting to [facility] for follow-up with aDhiana Villela LCSW. The patient is seen remotely using [facility] My Chart. Patient is being seen via telehealth and stated they are in a secure environment for this session.  Patient is located in her home.  The patient's condition being diagnosed/treated is appropriate for telemedicine. The provider identified herself as well as her credentials. The patient and/or patients guardian consent to be seen remotely, and when consent is given they understand that the consent allows for patient identifiable information to be sent to a third party as needed. They may refuse to be seen remotely at any time. The electronic data is encrypted and password protected, and the patient has been advised of the potential risks to privacy not withstanding such measures.    Today Patient presented for follow-up and began cognitive processing therapy.  Patient completed PCL 5 with a score of 49.  Patient participated in psychoeducation of CPT, PTSD treatments and symptoms, cognitive theory, role of emotions and recovery versus nonrecovery.    Clinical Maneuvering/Intervention: Cognitive processing therapy    Assisted Patient in processing above session content; acknowledged and normalized patient’s thoughts, feelings, and concerns.  Rationalized patient thought process regarding trauma.  Discussed triggers associated with patient's PTSD.  Also discussed coping skills for patient to implement such as cognitive reprocessing.    Allowed Patient to freely discuss issues  without interruption or judgement with unconditional positive regard, active listening skills, and empathy. Therapist provided a safe, confidential environment to facilitate the  development of a positive therapeutic relationship and encouraged open, honest communication. Assisted Patient in identifying risk factors which would indicate the need for higher level of care including thoughts to harm self or others and/or self-harming behavior and encouraged Patient to contact this office, call 911, or present to the nearest emergency room should any of these events occur. Discussed crisis intervention services and means to access. Patient adamantly and convincingly denies current suicidal or homicidal ideation or perceptual disturbance. Assisted Patient in processing session content; acknowledged and normalized Patient’s thoughts, feelings, and concerns by utilizing a person-centered approach in efforts to build appropriate rapport and a positive therapeutic relationship with open and honest communication. Therapist utilized dialectical behavior techniques to teach and model emotional regulation and relaxation methods. Therapist assisted Patient with identifying and implementing healthier coping strategies.     Assessment   Patient appears to be maintaining relative stability as compared to baseline.  Patient continues to struggle with PTSD.   As a result, they can be reasonably expected to continue to benefit from treatment and would likely be at increased risk for decompensation otherwise.    Mental Status Exam:   Hygiene:   good  Cooperation:  Cooperative  Eye Contact:  Good  Psychomotor Behavior:  Appropriate  Affect:  Appropriate  Mood: normal  Speech:  Normal  Thought Process:  Goal directed  Thought Content:  Normal  Suicidal:  None  Homicidal:  None  Hallucinations:  None  Delusion:  None  Memory:  Intact  Orientation:  Person, Place, Time and Situation  Reliability:  good  Insight:  Good  Judgement:  Good  Impulse Control:  Good  Physical/Medical Issues:  No      PHQ-Score Total:  PHQ-9 Total Score: (P) 15    ADAM-7:  Feeling nervous, anxious or on edge: (P) More than half the days  Not  being able to stop or control worrying: (P) Several days  Worrying too much about different things: (P) Several days  Trouble Relaxing: (P) Several days  Being so restless that it is hard to sit still: (P) Several days  Feeling afraid as if something awful might happen: (P) More than half the days  Becoming easily annoyed or irritable: (P) More than half the days  ADAM 7 Total Score: (P) 10  If you checked any problems, how difficult have these problems made it for you to do your work, take care of things at home, or get along with other people: (P) Somewhat difficult    Patient's Support Network Includes:  significant other    Functional Status: Moderate impairment     Progress toward goal: Not at goal    Prognosis: Good with Ongoing Treatment            Impression/Formulation:    VISIT DIAGNOSIS:     ICD-10-CM ICD-9-CM   1. Post traumatic stress disorder (PTSD)  F43.10 309.81        Patient appeared alert and oriented.  Patient is voluntarily requesting to continue outpatient therapy at [facility].  Patient is receptive to assistance with maintaining a stable lifestyle.  Patient presents with history of PTSD.  Patient is agreeable to attend routine therapy sessions.  Patient expressed desire to maintain stability and participate in the therapeutic process.        Crisis Plan:  If symptoms/behaviors persist, Patient will present to the nearest hospital for an assessment. Advised patient of [facility] and assessment services.     Plan:   Patient will continue in individual outpatient therapy with focus on improved functioning and coping skills, maintaining stability, and avoiding decompensation and the need for higher level of care.    Patient will contact this office, call 911 or present to the nearest emergency room should suicidal or homicidal ideations occur. Provide Cognitive Behavioral Therapy and Solution Focused Therapy to improve functioning, maintain stability,  and avoid decompensation and the need for higher level of care.     Return in about 1 weeks, or earlier if symptoms worsen or fail to improve.    Recommended Referrals: None        This document has been electronically signed by Dahiana Villela LCSW  January 30, 2022 19:04 EST        Part of this note may be an electronic transcription/translation of spoken language to printed text using the Dragon Dictation System.

## 2022-02-03 ENCOUNTER — TELEMEDICINE (OUTPATIENT)
Dept: PSYCHIATRY | Facility: CLINIC | Age: 22
End: 2022-02-03

## 2022-02-03 DIAGNOSIS — F43.10 POST TRAUMATIC STRESS DISORDER (PTSD): Primary | ICD-10-CM

## 2022-02-03 PROCEDURE — 90837 PSYTX W PT 60 MINUTES: CPT | Performed by: SOCIAL WORKER

## 2022-02-03 NOTE — PROGRESS NOTES
[facility]   Follow-up Progress Note     Date: February 3, 2022  Time In: 2:34PM  Time Out: 3:46 PM      PROGRESS NOTE  Data:  Marilee Cooper is a 21 y.o. female presenting to [facility] for follow-up with Dahiana Villela LCSW. The patient is seen remotely using [facility] My Chart. Patient is being seen via telehealth and stated they are in a secure environment for this session. The patient's condition being diagnosed/treated is appropriate for telemedicine. The provider identified herself as well as her credentials. The patient and/or patients guardian consent to be seen remotely, and when consent is given they understand that the consent allows for patient identifiable information to be sent to a third party as needed. They may refuse to be seen remotely at any time. The electronic data is encrypted and password protected, and the patient has been advised of the potential risks to privacy not withstanding such measures.    Today Patient presented for follow up appt. She stated things have been ok. Pt stated she did not do the homework. couldn't access the link. Was going to go back to it and didn't.  Patient completed PCL 5 with a score of 65.  Patient and provider explored what she would have written in the impact statement verbally.  Patient and provider began stuck point log.  Provider began discussing connection between events, thoughts and feelings.  Patient and provider reviewed reassignment of impact statement as well as next homework assignment of ABC sheets.    Clinical Maneuvering/Intervention: Cognitive processing therapy    Assisted Patient in processing above session content; acknowledged and normalized patient’s thoughts, feelings, and concerns.  Rationalized patient thought process regarding avoidance.  Discussed triggers associated with patient's PTSD.  Also discussed coping skills for patient to implement such  as cognitive reprocessing.    Allowed Patient to freely discuss issues  without interruption or judgement with unconditional positive regard, active listening skills, and empathy. Therapist provided a safe, confidential environment to facilitate the development of a positive therapeutic relationship and encouraged open, honest communication. Assisted Patient in identifying risk factors which would indicate the need for higher level of care including thoughts to harm self or others and/or self-harming behavior and encouraged Patient to contact this office, call 911, or present to the nearest emergency room should any of these events occur. Discussed crisis intervention services and means to access. Patient adamantly and convincingly denies current suicidal or homicidal ideation or perceptual disturbance. Assisted Patient in processing session content; acknowledged and normalized Patient’s thoughts, feelings, and concerns by utilizing a person-centered approach in efforts to build appropriate rapport and a positive therapeutic relationship with open and honest communication. Therapist utilized dialectical behavior techniques to teach and model emotional regulation and relaxation methods. Therapist assisted Patient with identifying and implementing healthier coping strategies.     Assessment   Patient appears to be maintaining relative stability as compared to baseline.  Patient continues to struggle with PTSD.   As a result, they can be reasonably expected to continue to benefit from treatment and would likely be at increased risk for decompensation otherwise.    Mental Status Exam:   Hygiene:   good  Cooperation:  Cooperative  Eye Contact:  Good  Psychomotor Behavior:  Appropriate  Affect:  Appropriate  Mood: normal  Speech:  Normal  Thought Process:  Goal directed  Thought Content:  Normal  Suicidal:  None  Homicidal:  None  Hallucinations:  None  Delusion:  None  Memory:  Intact  Orientation:  Person, Place, Time  and Situation  Reliability:  good  Insight:  Good  Judgement:  Good  Impulse Control:  Good  Physical/Medical Issues:  No      PHQ-Score Total:  PHQ-9 Total Score: (P) 20    ADAM-7:  Feeling nervous, anxious or on edge: (P) More than half the days  Not being able to stop or control worrying: (P) More than half the days  Worrying too much about different things: (P) More than half the days  Trouble Relaxing: (P) More than half the days  Being so restless that it is hard to sit still: (P) Several days  Feeling afraid as if something awful might happen: (P) More than half the days  Becoming easily annoyed or irritable: (P) More than half the days  ADAM 7 Total Score: (P) 13  If you checked any problems, how difficult have these problems made it for you to do your work, take care of things at home, or get along with other people: (P) Very difficult    Patient's Support Network Includes:  parents    Functional Status: Moderate impairment     Progress toward goal: Not at goal    Prognosis: Good with Ongoing Treatment            Impression/Formulation:    VISIT DIAGNOSIS: No diagnosis found.     Patient appeared alert and oriented.  Patient is voluntarily requesting to continue outpatient therapy at [facility].  Patient is receptive to assistance with maintaining a stable lifestyle.  Patient presents with history of trauma.  Patient is agreeable to attend routine therapy sessions.  Patient expressed desire to maintain stability and participate in the therapeutic process.        Crisis Plan:  If symptoms/behaviors persist, Patient will present to the nearest hospital for an assessment. Advised patient of [facility] ER and assessment services.     Plan:   Patient will continue in individual outpatient therapy with focus on improved functioning and coping skills, maintaining stability, and avoiding decompensation and the need for higher level of care.    Patient will contact this office,  call 911 or present to the nearest emergency room should suicidal or homicidal ideations occur. Provide Cognitive Behavioral Therapy and Solution Focused Therapy to improve functioning, maintain stability, and avoid decompensation and the need for higher level of care.     Return in about 1 weeks, or earlier if symptoms worsen or fail to improve.    Recommended Referrals: None        This document has been electronically signed by Dahiana Villela LCSW  February 3, 2022 14:33 EST        Part of this note may be an electronic transcription/translation of spoken language to printed text using the Dragon Dictation System.

## 2022-02-09 NOTE — PROGRESS NOTES
[facility]   Follow-up Progress Note     Date: February 9, 2022  Time In: 2:34PM  Time Out: 3:46 PM      PROGRESS NOTE  Data:  Marilee Cooper is a 21 y.o. female presenting to [facility] for follow-up with Dahiana Villela LCSW. The patient is seen remotely using [facility] My Chart. Patient is being seen via telehealth and stated they are in a secure environment for this session. The patient's condition being diagnosed/treated is appropriate for telemedicine. The provider identified herself as well as her credentials. The patient and/or patients guardian consent to be seen remotely, and when consent is given they understand that the consent allows for patient identifiable information to be sent to a third party as needed. They may refuse to be seen remotely at any time. The electronic data is encrypted and password protected, and the patient has been advised of the potential risks to privacy not withstanding such measures.    Today Patient presented for follow up appt. She stated things have been ok. Pt stated she did not do the homework. couldn't access the link. Was going to go back to it and didn't.  Patient completed PCL 5 with a score of 65.  Patient and provider explored what she would have written in the impact statement verbally.  Patient and provider began stuck point log.  Provider began discussing connection between events, thoughts and feelings.  Patient and provider reviewed reassignment of impact statement as well as next homework assignment of ABC sheets.    Clinical Maneuvering/Intervention: Cognitive processing therapy    Assisted Patient in processing above session content; acknowledged and normalized patient’s thoughts, feelings, and concerns.  Rationalized patient thought process regarding avoidance.  Discussed triggers associated with patient's PTSD.  Also discussed coping skills for patient to implement such  as cognitive reprocessing.    Allowed Patient to freely discuss issues  without interruption or judgement with unconditional positive regard, active listening skills, and empathy. Therapist provided a safe, confidential environment to facilitate the development of a positive therapeutic relationship and encouraged open, honest communication. Assisted Patient in identifying risk factors which would indicate the need for higher level of care including thoughts to harm self or others and/or self-harming behavior and encouraged Patient to contact this office, call 911, or present to the nearest emergency room should any of these events occur. Discussed crisis intervention services and means to access. Patient adamantly and convincingly denies current suicidal or homicidal ideation or perceptual disturbance. Assisted Patient in processing session content; acknowledged and normalized Patient’s thoughts, feelings, and concerns by utilizing a person-centered approach in efforts to build appropriate rapport and a positive therapeutic relationship with open and honest communication. Therapist utilized dialectical behavior techniques to teach and model emotional regulation and relaxation methods. Therapist assisted Patient with identifying and implementing healthier coping strategies.     Assessment   Patient appears to be maintaining relative stability as compared to baseline.  Patient continues to struggle with PTSD.   As a result, they can be reasonably expected to continue to benefit from treatment and would likely be at increased risk for decompensation otherwise.    Mental Status Exam:   Hygiene:   good  Cooperation:  Cooperative  Eye Contact:  Good  Psychomotor Behavior:  Appropriate  Affect:  Appropriate  Mood: normal  Speech:  Normal  Thought Process:  Goal directed  Thought Content:  Normal  Suicidal:  None  Homicidal:  None  Hallucinations:  None  Delusion:  None  Memory:  Intact  Orientation:  Person, Place, Time  and Situation  Reliability:  good  Insight:  Good  Judgement:  Good  Impulse Control:  Good  Physical/Medical Issues:  No      PHQ-Score Total:  PHQ-9 Total Score: (P) 20    ADAM-7:  Feeling nervous, anxious or on edge: (P) More than half the days  Not being able to stop or control worrying: (P) More than half the days  Worrying too much about different things: (P) More than half the days  Trouble Relaxing: (P) More than half the days  Being so restless that it is hard to sit still: (P) Several days  Feeling afraid as if something awful might happen: (P) More than half the days  Becoming easily annoyed or irritable: (P) More than half the days  ADAM 7 Total Score: (P) 13  If you checked any problems, how difficult have these problems made it for you to do your work, take care of things at home, or get along with other people: (P) Very difficult    Patient's Support Network Includes:  parents    Functional Status: Moderate impairment     Progress toward goal: Not at goal    Prognosis: Good with Ongoing Treatment            Impression/Formulation:    VISIT DIAGNOSIS:     ICD-10-CM ICD-9-CM   1. Post traumatic stress disorder (PTSD)  F43.10 309.81        Patient appeared alert and oriented.  Patient is voluntarily requesting to continue outpatient therapy at [facility].  Patient is receptive to assistance with maintaining a stable lifestyle.  Patient presents with history of trauma.  Patient is agreeable to attend routine therapy sessions.  Patient expressed desire to maintain stability and participate in the therapeutic process.        Crisis Plan:  If symptoms/behaviors persist, Patient will present to the nearest hospital for an assessment. Advised patient of [facility] and assessment services.     Plan:   Patient will continue in individual outpatient therapy with focus on improved functioning and coping skills, maintaining stability, and avoiding decompensation and the  need for higher level of care.    Patient will contact this office, call 911 or present to the nearest emergency room should suicidal or homicidal ideations occur. Provide Cognitive Behavioral Therapy and Solution Focused Therapy to improve functioning, maintain stability, and avoid decompensation and the need for higher level of care.     Return in about 1 weeks, or earlier if symptoms worsen or fail to improve.    Recommended Referrals: None        This document has been electronically signed by Dahiana Villela LCSW  February 9, 2022 08:18 EST        Part of this note may be an electronic transcription/translation of spoken language to printed text using the Dragon Dictation System.

## 2022-02-15 ENCOUNTER — TELEMEDICINE (OUTPATIENT)
Dept: PSYCHIATRY | Facility: CLINIC | Age: 22
End: 2022-02-15

## 2022-02-15 DIAGNOSIS — F43.10 POST TRAUMATIC STRESS DISORDER (PTSD): Primary | ICD-10-CM

## 2022-02-15 PROCEDURE — 90837 PSYTX W PT 60 MINUTES: CPT | Performed by: SOCIAL WORKER

## 2022-02-15 NOTE — PROGRESS NOTES
[facility]   Follow-up Progress Note     Date: February 22, 2022  Time In: 2:01pm  Time Out: 3:02 PM      PROGRESS NOTE  Data:  Marilee Cooper is a 21 y.o. female presenting to [facility] for follow-up with Dahiana Villela LCSW. The patient is seen remotely using [facility] My Chart. Patient is being seen via telehealth and stated they are in a secure environment for this session.  Patient was located in her car.  The patient's condition being diagnosed/treated is appropriate for telemedicine. The provider identified herself as well as her credentials. The patient and/or patients guardian consent to be seen remotely, and when consent is given they understand that the consent allows for patient identifiable information to be sent to a third party as needed. They may refuse to be seen remotely at any time. The electronic data is encrypted and password protected, and the patient has been advised of the potential risks to privacy not withstanding such measures.    Today Patient presented for follow up appt. she checked in with how she has been.  Patient completed PCL 5 with a score 55.  Patient and provider continued cognitive processing therapy.  Patient and provider reviewed patient's impact statement and began exploring stuck points.  Provider introduced connections between events, thoughts and feelings.  Patient and provider practiced ABC sheets and assigned next practice assignment.    Clinical Maneuvering/Intervention: CPT    Assisted Patient in processing above session content; acknowledged and normalized patient’s thoughts, feelings, and concerns.  Rationalized patient thought process regarding stuck points.  Discussed triggers associated with patient's PTSD.  Also discussed coping skills for patient to implement such as cognitive reprocessing.    Allowed Patient to freely discuss issues  without interruption or judgement with  unconditional positive regard, active listening skills, and empathy. Therapist provided a safe, confidential environment to facilitate the development of a positive therapeutic relationship and encouraged open, honest communication. Assisted Patient in identifying risk factors which would indicate the need for higher level of care including thoughts to harm self or others and/or self-harming behavior and encouraged Patient to contact this office, call 911, or present to the nearest emergency room should any of these events occur. Discussed crisis intervention services and means to access. Patient adamantly and convincingly denies current suicidal or homicidal ideation or perceptual disturbance. Assisted Patient in processing session content; acknowledged and normalized Patient’s thoughts, feelings, and concerns by utilizing a person-centered approach in efforts to build appropriate rapport and a positive therapeutic relationship with open and honest communication. Therapist utilized dialectical behavior techniques to teach and model emotional regulation and relaxation methods. Therapist assisted Patient with identifying and implementing healthier coping strategies.     Assessment   Patient appears to be maintaining relative stability as compared to baseline.  Patient continues to struggle with PTSD.   As a result, they can be reasonably expected to continue to benefit from treatment and would likely be at increased risk for decompensation otherwise.    Mental Status Exam:   Hygiene:   good  Cooperation:  Cooperative  Eye Contact:  Good  Psychomotor Behavior:  Appropriate  Affect:  Appropriate  Mood: normal  Speech:  Normal  Thought Process:  Goal directed  Thought Content:  Normal  Suicidal:  None  Homicidal:  None  Hallucinations:  None  Delusion:  None  Memory:  Intact  Orientation:  Person, Place, Time and Situation  Reliability:  good  Insight:  Good  Judgement:  Good  Impulse Control:  Good  Physical/Medical  Issues:  No      PHQ-Score Total:  PHQ-9 Total Score: (P) 18    ADAM-7:  Feeling nervous, anxious or on edge: (P) Nearly every day  Not being able to stop or control worrying: (P) Several days  Worrying too much about different things: (P) More than half the days  Trouble Relaxing: (P) More than half the days  Being so restless that it is hard to sit still: (P) Several days  Feeling afraid as if something awful might happen: (P) More than half the days  Becoming easily annoyed or irritable: (P) Several days  ADAM 7 Total Score: (P) 12  If you checked any problems, how difficult have these problems made it for you to do your work, take care of things at home, or get along with other people: (P) Very difficult    Patient's Support Network Includes:  significant other    Functional Status: Moderate impairment     Progress toward goal: Not at goal    Prognosis: Good with Ongoing Treatment            Impression/Formulation:    VISIT DIAGNOSIS:     ICD-10-CM ICD-9-CM   1. Post traumatic stress disorder (PTSD)  F43.10 309.81        Patient appeared alert and oriented.  Patient is voluntarily requesting to continue outpatient therapy at [facility].  Patient is receptive to assistance with maintaining a stable lifestyle.  Patient presents with history of PTSD.  Patient is agreeable to attend routine therapy sessions.  Patient expressed desire to maintain stability and participate in the therapeutic process.        Crisis Plan:  If symptoms/behaviors persist, Patient will present to the nearest hospital for an assessment. Advised patient of [facility] ER and assessment services.     Plan:   Patient will continue in individual outpatient therapy with focus on improved functioning and coping skills, maintaining stability, and avoiding decompensation and the need for higher level of care.    Patient will contact this office, call 911 or present to the nearest emergency room should suicidal  or homicidal ideations occur. Provide Cognitive Behavioral Therapy and Solution Focused Therapy to improve functioning, maintain stability, and avoid decompensation and the need for higher level of care.     Return in about 1 weeks, or earlier if symptoms worsen or fail to improve.    Recommended Referrals:  none        This document has been electronically signed by Dahiana Villela LCSW  February 22, 2022 07:59 EST        Part of this note may be an electronic transcription/translation of spoken language to printed text using the Dragon Dictation System.

## 2022-02-22 ENCOUNTER — TELEMEDICINE (OUTPATIENT)
Dept: PSYCHIATRY | Facility: CLINIC | Age: 22
End: 2022-02-22

## 2022-02-22 DIAGNOSIS — F43.10 POST TRAUMATIC STRESS DISORDER (PTSD): Primary | ICD-10-CM

## 2022-02-22 PROCEDURE — 90834 PSYTX W PT 45 MINUTES: CPT | Performed by: SOCIAL WORKER

## 2022-02-22 NOTE — PROGRESS NOTES
[facility]   Follow-up Progress Note     Date: February 27, 2022  Time In: 10:51AM  Time Out: 11:35 AM      PROGRESS NOTE  Data:  Marilee Cooper is a 21 y.o. female presenting to [facility] for follow-up with Dahiana Villela LCSW. The patient is seen remotely using [facility] My Chart. Patient is being seen via telehealth and stated they are in a secure environment for this session.  Patient was located in her car.  The patient's condition being diagnosed/treated is appropriate for telemedicine. The provider identified herself as well as her credentials. The patient and/or patients guardian consent to be seen remotely, and when consent is given they understand that the consent allows for patient identifiable information to be sent to a third party as needed. They may refuse to be seen remotely at any time. The electronic data is encrypted and password protected, and the patient has been advised of the potential risks to privacy not withstanding such measures.    Today Patient presented for follow up, she checked in with how the homework was.  She completed PCL 5 with a score 57.  Patient was able to review ABC sheets and continue to identify and challenge stuck points.  Patient and provider reviewed practice assignment for this week.  Clinical Maneuvering/Intervention: CPT    Assisted Patient in processing above session content; acknowledged and normalized patient’s thoughts, feelings, and concerns.  Rationalized patient thought process regarding stuck points.  Discussed triggers associated with patient's PTSD.  Also discussed coping skills for patient to implement such as cognitive reprocessing.    Allowed Patient to freely discuss issues  without interruption or judgement with unconditional positive regard, active listening skills, and empathy. Therapist provided a safe, confidential environment to facilitate the development of  a positive therapeutic relationship and encouraged open, honest communication. Assisted Patient in identifying risk factors which would indicate the need for higher level of care including thoughts to harm self or others and/or self-harming behavior and encouraged Patient to contact this office, call 911, or present to the nearest emergency room should any of these events occur. Discussed crisis intervention services and means to access. Patient adamantly and convincingly denies current suicidal or homicidal ideation or perceptual disturbance. Assisted Patient in processing session content; acknowledged and normalized Patient’s thoughts, feelings, and concerns by utilizing a person-centered approach in efforts to build appropriate rapport and a positive therapeutic relationship with open and honest communication. Therapist utilized dialectical behavior techniques to teach and model emotional regulation and relaxation methods. Therapist assisted Patient with identifying and implementing healthier coping strategies.     Assessment   Patient appears to be maintaining relative stability as compared to baseline.  Patient continues to struggle with PTSD.   As a result, they can be reasonably expected to continue to benefit from treatment and would likely be at increased risk for decompensation otherwise.    Mental Status Exam:   Hygiene:   good  Cooperation:  Cooperative  Eye Contact:  Good  Psychomotor Behavior:  Appropriate  Affect:  Appropriate  Mood: normal  Speech:  Normal  Thought Process:  Goal directed  Thought Content:  Normal  Suicidal:  None  Homicidal:  None  Hallucinations:  None  Delusion:  None  Memory:  Intact  Orientation:  Person, Place, Time and Situation  Reliability:  good  Insight:  Good  Judgement:  Good  Impulse Control:  Good  Physical/Medical Issues:  No      PHQ-Score Total:  PHQ-9 Total Score:      ADAM-7:       Patient's Support Network Includes:  parents    Functional Status: Moderate  impairment     Progress toward goal: Not at goal    Prognosis: Good with Ongoing Treatment            Impression/Formulation:    VISIT DIAGNOSIS:     ICD-10-CM ICD-9-CM   1. Post traumatic stress disorder (PTSD)  F43.10 309.81        Patient appeared alert and oriented.  Patient is voluntarily requesting to continue outpatient therapy at [facility].  Patient is receptive to assistance with maintaining a stable lifestyle.  Patient presents with history of trauma.  Patient is agreeable to attend routine therapy sessions.  Patient expressed desire to maintain stability and participate in the therapeutic process.        Crisis Plan:  If symptoms/behaviors persist, Patient will present to the nearest hospital for an assessment. Advised patient of [facility] ER and assessment services.     Plan:   Patient will continue in individual outpatient therapy with focus on improved functioning and coping skills, maintaining stability, and avoiding decompensation and the need for higher level of care.    Patient will contact this office, call 911 or present to the nearest emergency room should suicidal or homicidal ideations occur. Provide Cognitive Behavioral Therapy and Solution Focused Therapy to improve functioning, maintain stability, and avoid decompensation and the need for higher level of care.     Return in about 1 weeks, or earlier if symptoms worsen or fail to improve.    Recommended Referrals: None        This document has been electronically signed by Dahiana Villela LCSW  February 27, 2022 13:47 EST        Part of this note may be an electronic transcription/translation of spoken language to printed text using the Dragon Dictation System.

## 2022-03-15 ENCOUNTER — TELEMEDICINE (OUTPATIENT)
Dept: PSYCHIATRY | Facility: CLINIC | Age: 22
End: 2022-03-15

## 2022-03-15 DIAGNOSIS — F43.10 POST TRAUMATIC STRESS DISORDER (PTSD): Primary | ICD-10-CM

## 2022-03-15 PROCEDURE — 90837 PSYTX W PT 60 MINUTES: CPT | Performed by: SOCIAL WORKER

## 2022-03-15 NOTE — PROGRESS NOTES
[facility]   Follow-up Progress Note     Date: March 27, 2022  Time In: 11:02AM  Time Out: 11:59 AM      PROGRESS NOTE  Data:  Marilee Cooper is a 21 y.o. female presenting to [facility] for follow-up with Dahiana Villela LCSW. The patient is seen remotely using [facility] My Chart. Patient is being seen via telehealth and stated they are in a secure environment for this session.  Patient is located at home.  The patient's condition being diagnosed/treated is appropriate for telemedicine. The provider identified herself as well as her credentials. The patient and/or patients guardian consent to be seen remotely, and when consent is given they understand that the consent allows for patient identifiable information to be sent to a third party as needed. They may refuse to be seen remotely at any time. The electronic data is encrypted and password protected, and the patient has been advised of the potential risks to privacy not withstanding such measures.    Today Patient presented for follow-up and checked in with how things have been going.  Patient stated she was rejected from [employer] and this was very difficult.  Patient is coping and adjusting okay though.  Patient struggled to complete the homework.  Patient stated with a combination of everything going on and feeling overwhelmed she was not able to do this.  Patient and provider explored homework assignment and how to proceed to continue cognitive processing therapy..    Clinical Maneuvering/Intervention: CPT  Assisted Patient in processing above session content; acknowledged and normalized patient’s thoughts, feelings, and concerns.  Rationalized patient thought process regarding college applications.  Discussed triggers associated with patient's PTSD.  Also discussed coping skills for patient to implement such as cognitive reprocessing.    Allowed Patient to freely discuss  issues  without interruption or judgement with unconditional positive regard, active listening skills, and empathy. Therapist provided a safe, confidential environment to facilitate the development of a positive therapeutic relationship and encouraged open, honest communication. Assisted Patient in identifying risk factors which would indicate the need for higher level of care including thoughts to harm self or others and/or self-harming behavior and encouraged Patient to contact this office, call 911, or present to the nearest emergency room should any of these events occur. Discussed crisis intervention services and means to access. Patient adamantly and convincingly denies current suicidal or homicidal ideation or perceptual disturbance. Assisted Patient in processing session content; acknowledged and normalized Patient’s thoughts, feelings, and concerns by utilizing a person-centered approach in efforts to build appropriate rapport and a positive therapeutic relationship with open and honest communication. Therapist utilized dialectical behavior techniques to teach and model emotional regulation and relaxation methods. Therapist assisted Patient with identifying and implementing healthier coping strategies.     Assessment   Patient appears to be maintaining relative stability as compared to baseline.  Patient continues to struggle with PTSD.   As a result, they can be reasonably expected to continue to benefit from treatment and would likely be at increased risk for decompensation otherwise.    Mental Status Exam:   Hygiene:   good  Cooperation:  Cooperative  Eye Contact:  Good  Psychomotor Behavior:  Appropriate  Affect:  Appropriate  Mood: normal  Speech:  Normal  Thought Process:  Goal directed  Thought Content:  Normal  Suicidal:  None  Homicidal:  None  Hallucinations:  None  Delusion:  None  Memory:  Intact  Orientation:  Person  Reliability:  good  Insight:  Good  Judgement:  Good  Impulse Control:   Good  Physical/Medical Issues:  No      PHQ-Score Total:  PHQ-9 Total Score:      ADAM-7:  Feeling nervous, anxious or on edge: (P) Nearly every day  Not being able to stop or control worrying: (P) Nearly every day  Worrying too much about different things: (P) Nearly every day  Trouble Relaxing: (P) Nearly every day  Being so restless that it is hard to sit still: (P) Not at all  Feeling afraid as if something awful might happen: (P) Nearly every day  Becoming easily annoyed or irritable: (P) Nearly every day  ADAM 7 Total Score: (P) 18  If you checked any problems, how difficult have these problems made it for you to do your work, take care of things at home, or get along with other people: (P) Very difficult    Patient's Support Network Includes:  parents    Functional Status: Moderate impairment     Progress toward goal: Not at goal    Prognosis: Good with Ongoing Treatment            Impression/Formulation:    VISIT DIAGNOSIS:     ICD-10-CM ICD-9-CM   1. Post traumatic stress disorder (PTSD)  F43.10 309.81        Patient appeared alert and oriented.  Patient is voluntarily requesting to continue outpatient therapy at [facility].  Patient is receptive to assistance with maintaining a stable lifestyle.  Patient presents with history of PTSD.  Patient is agreeable to attend routine therapy sessions.  Patient expressed desire to maintain stability and participate in the therapeutic process.        Crisis Plan:  If symptoms/behaviors persist, Patient will present to the nearest hospital for an assessment. Advised patient of [facility] ER and assessment services.     Plan:   Patient will continue in individual outpatient therapy with focus on improved functioning and coping skills, maintaining stability, and avoiding decompensation and the need for higher level of care.    Patient will contact this office, call 911 or present to the nearest emergency room should suicidal or  homicidal ideations occur. Provide Cognitive Behavioral Therapy and Solution Focused Therapy to improve functioning, maintain stability, and avoid decompensation and the need for higher level of care.     Return in about 1 weeks, or earlier if symptoms worsen or fail to improve.    Recommended Referrals: None        This document has been electronically signed by Dahiana iVllela LCSW  March 27, 2022 20:05 EDT        Part of this note may be an electronic transcription/translation of spoken language to printed text using the Dragon Dictation System.

## 2022-03-16 ENCOUNTER — OFFICE VISIT (OUTPATIENT)
Dept: FAMILY MEDICINE CLINIC | Facility: CLINIC | Age: 22
End: 2022-03-16

## 2022-03-16 VITALS
SYSTOLIC BLOOD PRESSURE: 106 MMHG | DIASTOLIC BLOOD PRESSURE: 70 MMHG | HEART RATE: 79 BPM | OXYGEN SATURATION: 99 % | HEIGHT: 67 IN | BODY MASS INDEX: 20.37 KG/M2 | WEIGHT: 129.8 LBS

## 2022-03-16 DIAGNOSIS — D64.9 ANEMIA, UNSPECIFIED TYPE: ICD-10-CM

## 2022-03-16 DIAGNOSIS — K92.1 HEMATOCHEZIA: Primary | ICD-10-CM

## 2022-03-16 DIAGNOSIS — F42.9 OBSESSIVE-COMPULSIVE DISORDER, UNSPECIFIED TYPE: ICD-10-CM

## 2022-03-16 DIAGNOSIS — F95.2 TOURETTE SYNDROME: ICD-10-CM

## 2022-03-16 DIAGNOSIS — F41.1 ANXIETY STATE: ICD-10-CM

## 2022-03-16 DIAGNOSIS — K58.2 IRRITABLE BOWEL SYNDROME WITH BOTH CONSTIPATION AND DIARRHEA: ICD-10-CM

## 2022-03-16 DIAGNOSIS — Q79.60 EHLERS-DANLOS SYNDROME: ICD-10-CM

## 2022-03-16 DIAGNOSIS — K21.9 GASTROESOPHAGEAL REFLUX DISEASE, UNSPECIFIED WHETHER ESOPHAGITIS PRESENT: ICD-10-CM

## 2022-03-16 DIAGNOSIS — Z00.00 ENCOUNTER FOR MEDICAL EXAMINATION TO ESTABLISH CARE: ICD-10-CM

## 2022-03-16 DIAGNOSIS — R10.9 ABDOMINAL CRAMPING: ICD-10-CM

## 2022-03-16 DIAGNOSIS — F43.10 PTSD (POST-TRAUMATIC STRESS DISORDER): ICD-10-CM

## 2022-03-16 PROCEDURE — 99214 OFFICE O/P EST MOD 30 MIN: CPT | Performed by: STUDENT IN AN ORGANIZED HEALTH CARE EDUCATION/TRAINING PROGRAM

## 2022-03-16 NOTE — PROGRESS NOTES
New Patient Office Visit      Patient Name: Marilee Cooper  : 2000   MRN: [id number]   Care Team: Patient Care Team:  Kady Webster DO as PCP - General (Internal Medicine)    Chief Complaint:    Chief Complaint   Patient presents with   • Establish Care       History of Present Illness: Marilee Cooper is a 21 y.o. female with EDS, dyspepsia, IBS, anxiety, OCD, PTSD, tourettes who is here today to establish care.  Transitioning care from WAYNE Vicente, to myself.     Anxiety, OCD, Tourette - has been following with Behavioral Health for talk therapy. Interested in medication therapy as she plans to start graduate school and does not want this interfering with her school work. She plans to discuss this with . Has tried 2-3 SSRIs in the past and did not tolerate them well due to various side effects.     Michaela Dancecils - diagnosed in  after Abdulkadir procedure for pectus excavatum at [facility]. Follows up once yearly.     GI symptoms - alternates between constipation and diarrhea. Has noticed some black tarry stools and bright red blood mixed within her stool 2-3x per week over the past several months. Her BM are occasionally painful before and after bowel movements. Denies known family history of IBD but unsure.     GERD - occurring daily, worse in the morning. Admits to associated nausea, gagging, regurgitation, sour taste. Sometimes struggles with swallowing. Has had barium swallow in  which was normal.     Subjective      Review of Systems:   Review of Systems - See HPI    Past Medical History:   Past Medical History:   Diagnosis Date   • Anemia    • Anxiety    • Back problem    • Bronchitis    • Depression    • Eating disorder    • GERD (gastroesophageal reflux disease)    • Headache    • History of medical problems 2016    Ehlors Danlos Syndrome   • Inflammatory bowel disease    • Irritable bowel syndrome    • Low back pain    • OCD (obsessive  compulsive disorder)    • Pneumonia    • Scoliosis    • Substance abuse (HCC)    • Tourette's    • Tourette's        Past Surgical History:   Past Surgical History:   Procedure Laterality Date   • RECONSTRUCTION PECTUS EXCAVATUM / CARINATUM W/ OR W/O THORASCOPY     • RHINOPLASTY     • SEPTOPLASTY     • SINUS SURGERY     • TONSILLECTOMY         Family History:   Family History   Problem Relation Age of Onset   • Arthritis Mother    • Asthma Mother    • Hypothyroidism Mother    • Depression Mother    • Mental illness Mother         Bipolar/anxiety/ocd/adhd   • Thyroid disease Mother         Underactive thyroid   • Hypertension Father    • Cancer Maternal Grandfather    • Early death Maternal Grandfather    • Depression Sister    • Mental illness Sister         BPD/Bipolar/OCD/Anxiety/adhd   • Developmental Disability Paternal Uncle         Downs Syndrome   • Miscarriages / Stillbirths Maternal Grandmother                Social History:   Social History     Socioeconomic History   • Marital status: Single   Tobacco Use   • Smoking status: Passive Smoke Exposure - Never Smoker   • Smokeless tobacco: Never Used   • Tobacco comment: smokes e cig occas   Vaping Use   • Vaping Use: Some days   Substance and Sexual Activity   • Alcohol use: Yes     Alcohol/week: 4.0 standard drinks     Types: 4 Glasses of wine per week     Comment: social   • Drug use: Yes     Frequency: 7.0 times per week     Types: Marijuana     Comment: 1-2/day   • Sexual activity: Yes     Partners: Male     Birth control/protection: None       Tobacco History:   Social History     Tobacco Use   Smoking Status Passive Smoke Exposure - Never Smoker   Smokeless Tobacco Never Used   Tobacco Comment    smokes e cig occas       Medications:     Current Outpatient Medications:   •  ibuprofen (ADVIL,MOTRIN) 200 MG tablet, Take 400 mg by mouth Every 6 (Six) Hours As Needed for Mild Pain ., Disp: , Rfl:     Allergies:   Allergies   Allergen Reactions   •  "Metronidazole Nausea Only   • Augmentin [Amoxicillin-Pot Clavulanate] Rash       Objective     Physical Exam:  Vital Signs:   Vitals:    03/16/22 1349   BP: 106/70   Pulse: 79   SpO2: 99%   Weight: 58.9 kg (129 lb 12.8 oz)   Height: 170 cm (66.93\")     Body mass index is 20.37 kg/m².     Physical Exam  Vitals reviewed.   Constitutional:       Appearance: Normal appearance.   Cardiovascular:      Rate and Rhythm: Normal rate.      Pulses: Normal pulses.   Pulmonary:      Effort: Pulmonary effort is normal. No respiratory distress.   Abdominal:      General: Abdomen is flat. There is no distension.      Palpations: Abdomen is soft.      Tenderness: There is no abdominal tenderness. There is no right CVA tenderness, left CVA tenderness, guarding or rebound.   Skin:     General: Skin is warm and dry.   Neurological:      Mental Status: She is alert.   Psychiatric:         Mood and Affect: Mood normal.         Behavior: Behavior normal.         Judgment: Judgment normal.         Assessment / Plan      Assessment/Plan:   Problems Addressed This Visit  Diagnoses and all orders for this visit:    1. Hematochezia (Primary)  -     Ambulatory Referral to Gastroenterology  -     C-reactive protein; Future  -     Sedimentation rate, automated; Future  -     Celiac Disease Panel; Future    Given her history of recurrent hematochezia, abdominal discomfort, anemia, I am concerned for possible underlying inflammatory bowel disease rather than previously diagnosed IBS.  Will obtain inflammatory markers and refer to GI.  She would benefit from EGD and colonoscopy for further evaluation of her GI symptoms.    2. Abdominal cramping  -     Ambulatory Referral to Gastroenterology  -     C-reactive protein; Future  -     Sedimentation rate, automated; Future  -     Celiac Disease Panel; Future  -     H. Pylori Breath Test - Breath, Lung; Future    3. Irritable bowel syndrome with both constipation and diarrhea    4. Anemia, unspecified " type  -     Ambulatory Referral to Gastroenterology  -     CBC No Differential; Future  -     C-reactive protein; Future  -     Sedimentation rate, automated; Future  -     Celiac Disease Panel; Future    5. Gastroesophageal reflux disease, unspecified whether esophagitis present  -     H. Pylori Breath Test - Breath, Lung; Future    Rule out H. Pylori.  If this is negative we could try empiric trial of PPI.  However patient would benefit from EGD as she does have associated dysphagia and anemia with this.  Referred to GI as above    6. Tourette syndrome  7. Obsessive-compulsive disorder, unspecified type  8. Anxiety state  9. PTSD (post-traumatic stress disorder)  Following with behavioral health.  Currently only receiving tongue therapy but would like medication therapy.  She will discuss this with them.    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    11. Encounter for medical examination to establish care          Plan of care reviewed with patient at the conclusion of today's visit. Education was provided regarding diagnosis and management.  Patient verbalizes understanding of and agreement with management plan.      Follow Up:   Return in about 3 months (around 6/16/2022) for Annual.          DO TERRY Hodgson RD  [facility]  [id number]  [facility] [address]  Fax [phone]  Phone [phone]

## 2022-03-29 ENCOUNTER — TELEMEDICINE (OUTPATIENT)
Dept: PSYCHIATRY | Facility: CLINIC | Age: 22
End: 2022-03-29

## 2022-03-29 DIAGNOSIS — F43.10 POST TRAUMATIC STRESS DISORDER (PTSD): Primary | ICD-10-CM

## 2022-03-29 PROCEDURE — 90837 PSYTX W PT 60 MINUTES: CPT | Performed by: SOCIAL WORKER

## 2022-03-29 NOTE — PROGRESS NOTES
[facility]   Follow-up Progress Note     Date: March 29, 2022  Time In: 10:31AM  Time Out: 11:29 AM      PROGRESS NOTE  Data:  Marilee Cooper is a 21 y.o. female presenting to [facility] for follow-up with Dahiana Villela LCSW. The patient is seen remotely using [facility] My Chart. Patient is being seen via telehealth and stated they are in a secure environment for this session.  Patient is located in her home.  The patient's condition being diagnosed/treated is appropriate for telemedicine. The provider identified herself as well as her credentials. The patient and/or patients guardian consent to be seen remotely, and when consent is given they understand that the consent allows for patient identifiable information to be sent to a third party as needed. They may refuse to be seen remotely at any time. The electronic data is encrypted and password protected, and the patient has been advised of the potential risks to privacy not withstanding such measures.    Today Patient presented for follow up appointment. Patient checked in with how things have been going. She stated she did not get into grad school and in the same day found out his boyfriend had been cheating for 7 months. Patient processed thoughts and feelings related to this.  Patient expressed conflicting feelings and how to work through this.  Patient and provider completed supportive session and will continue cognitive processing therapy at next session.  Patient did complete homework and reviewed 1 challenging questions worksheet together.  Patient continued to identify trauma related stuck points.      Clinical Maneuvering/Intervention: Cognitive processing therapy    Assisted Patient in processing above session content; acknowledged and normalized patient’s thoughts, feelings, and concerns.  Rationalized patient thought process regarding relationship stressors.   Discussed triggers associated with patient's PTSD.  Also discussed coping skills for patient to implement such as cognitive reprocessing.    Allowed Patient to freely discuss issues  without interruption or judgement with unconditional positive regard, active listening skills, and empathy. Therapist provided a safe, confidential environment to facilitate the development of a positive therapeutic relationship and encouraged open, honest communication. Assisted Patient in identifying risk factors which would indicate the need for higher level of care including thoughts to harm self or others and/or self-harming behavior and encouraged Patient to contact this office, call 911, or present to the nearest emergency room should any of these events occur. Discussed crisis intervention services and means to access. Patient adamantly and convincingly denies current suicidal or homicidal ideation or perceptual disturbance. Assisted Patient in processing session content; acknowledged and normalized Patient’s thoughts, feelings, and concerns by utilizing a person-centered approach in efforts to build appropriate rapport and a positive therapeutic relationship with open and honest communication. Therapist utilized dialectical behavior techniques to teach and model emotional regulation and relaxation methods. Therapist assisted Patient with identifying and implementing healthier coping strategies.     Assessment   Patient appears to be maintaining relative stability as compared to baseline.  Patient continues to struggle with PTSD.   As a result, they can be reasonably expected to continue to benefit from treatment and would likely be at increased risk for decompensation otherwise.    Mental Status Exam:   Hygiene:   good  Cooperation:  Cooperative  Eye Contact:  Good  Psychomotor Behavior:  Appropriate  Affect:  Appropriate  Mood: normal  Speech:  Normal  Thought Process:  Goal directed  Thought Content:  Normal  Suicidal:   None  Homicidal:  None  Hallucinations:  None  Delusion:  None  Memory:  Intact  Orientation:  Person, Place, Time and Situation  Reliability:  good  Insight:  Good  Judgement:  Good  Impulse Control:  Good  Physical/Medical Issues:  No      PHQ-Score Total:  PHQ-9 Total Score:      ADAM-7:  Feeling nervous, anxious or on edge: (P) Nearly every day  Not being able to stop or control worrying: (P) Nearly every day  Worrying too much about different things: (P) Nearly every day  Trouble Relaxing: (P) Nearly every day  Being so restless that it is hard to sit still: (P) Nearly every day  Feeling afraid as if something awful might happen: (P) Nearly every day  Becoming easily annoyed or irritable: (P) Nearly every day  ADAM 7 Total Score: (P) 21  If you checked any problems, how difficult have these problems made it for you to do your work, take care of things at home, or get along with other people: (P) Extremely difficult    Patient's Support Network Includes:  parents    Functional Status: Moderate impairment     Progress toward goal: Not at goal    Prognosis: Good with Ongoing Treatment            Impression/Formulation:    VISIT DIAGNOSIS:     ICD-10-CM ICD-9-CM   1. Post traumatic stress disorder (PTSD)  F43.10 309.81        Patient appeared alert and oriented.  Patient is voluntarily requesting to continue outpatient therapy at [facility].  Patient is receptive to assistance with maintaining a stable lifestyle.  Patient presents with history of PTSD.  Patient is agreeable to attend routine therapy sessions.  Patient expressed desire to maintain stability and participate in the therapeutic process.        Crisis Plan:  If symptoms/behaviors persist, Patient will present to the nearest hospital for an assessment. Advised patient of [facility] ER and assessment services.     Plan:   Patient will continue in individual outpatient therapy with focus on improved functioning and  coping skills, maintaining stability, and avoiding decompensation and the need for higher level of care.    Patient will contact this office, call 911 or present to the nearest emergency room should suicidal or homicidal ideations occur. Provide Cognitive Behavioral Therapy and Solution Focused Therapy to improve functioning, maintain stability, and avoid decompensation and the need for higher level of care.     Return in about 1 weeks, or earlier if symptoms worsen or fail to improve.    Recommended Referrals: None        This document has been electronically signed by Dahiana Villela LCSW  March 29, 2022 12:28 EDT        Part of this note may be an electronic transcription/translation of spoken language to printed text using the Dragon Dictation System.

## 2022-04-07 ENCOUNTER — TELEMEDICINE (OUTPATIENT)
Dept: PSYCHIATRY | Facility: CLINIC | Age: 22
End: 2022-04-07

## 2022-04-07 DIAGNOSIS — F43.10 POST TRAUMATIC STRESS DISORDER (PTSD): Primary | ICD-10-CM

## 2022-04-07 PROCEDURE — 90837 PSYTX W PT 60 MINUTES: CPT | Performed by: SOCIAL WORKER

## 2022-04-07 NOTE — PROGRESS NOTES
[facility]   Follow-up Progress Note     Date: April 24, 2022  Time In: 4:02PM  Time Out: 5:00 PM      PROGRESS NOTE  Data:  Marilee Cooper is a 21 y.o. female presenting to [facility] for follow-up with Dahiana Villela LCSW. The patient is seen remotely using [facility] My Chart. Patient is being seen via telehealth and stated they are in a secure environment for this session.  Patient is located at her home.  The patient's condition being diagnosed/treated is appropriate for telemedicine. The provider identified herself as well as her credentials. The patient and/or patients guardian consent to be seen remotely, and when consent is given they understand that the consent allows for patient identifiable information to be sent to a third party as needed. They may refuse to be seen remotely at any time. The electronic data is encrypted and password protected, and the patient has been advised of the potential risks to privacy not withstanding such measures.    Today Patient presented for follow-up appointment.  She stated she has been working a lot of overtime at work and has been stressed.  Patient stated things in her relationship are about the same and there still fighting a lot.  Patient completed PCL 5 with a score 53.  Patient stated she only completed 1 worksheet of the challenging questions.  Patient and provider reviewed stuck points and patterns of problematic thinking.  Patient and provider discussed the need to increase adherence to homework.    Clinical Maneuvering/Intervention: Cognitive processing therapy    Assisted Patient in processing above session content; acknowledged and normalized patient’s thoughts, feelings, and concerns.  Rationalized patient thought process regarding relationships and stuck points.  Discussed triggers associated with patient's PTSD.  Also discussed coping skills for patient to implement  such as cognitive reprocessing.    Allowed Patient to freely discuss issues  without interruption or judgement with unconditional positive regard, active listening skills, and empathy. Therapist provided a safe, confidential environment to facilitate the development of a positive therapeutic relationship and encouraged open, honest communication. Assisted Patient in identifying risk factors which would indicate the need for higher level of care including thoughts to harm self or others and/or self-harming behavior and encouraged Patient to contact this office, call 911, or present to the nearest emergency room should any of these events occur. Discussed crisis intervention services and means to access. Patient adamantly and convincingly denies current suicidal or homicidal ideation or perceptual disturbance. Assisted Patient in processing session content; acknowledged and normalized Patient’s thoughts, feelings, and concerns by utilizing a person-centered approach in efforts to build appropriate rapport and a positive therapeutic relationship with open and honest communication. Therapist utilized dialectical behavior techniques to teach and model emotional regulation and relaxation methods. Therapist assisted Patient with identifying and implementing healthier coping strategies.     Assessment   Patient appears to be maintaining relative stability as compared to baseline.  Patient continues to struggle with PTSD.   As a result, they can be reasonably expected to continue to benefit from treatment and would likely be at increased risk for decompensation otherwise.    Mental Status Exam:   Hygiene:   good  Cooperation:  Cooperative  Eye Contact:  Good  Psychomotor Behavior:  Appropriate  Affect:  Appropriate  Mood: normal  Speech:  Normal  Thought Process:  Goal directed  Thought Content:  Normal  Suicidal:  None  Homicidal:  None  Hallucinations:  None  Delusion:  None  Memory:  Intact  Orientation:  Person, Place,  Time and Situation  Reliability:  good  Insight:  Good  Judgement:  Good  Impulse Control:  Good  Physical/Medical Issues:  No      PHQ-Score Total:  PHQ-9 Total Score:      ADAM-7:  Feeling nervous, anxious or on edge: (P) More than half the days  Not being able to stop or control worrying: (P) Several days  Worrying too much about different things: (P) More than half the days  Trouble Relaxing: (P) More than half the days  Being so restless that it is hard to sit still: (P) Several days  Feeling afraid as if something awful might happen: (P) More than half the days  Becoming easily annoyed or irritable: (P) Nearly every day  ADAM 7 Total Score: (P) 13  If you checked any problems, how difficult have these problems made it for you to do your work, take care of things at home, or get along with other people: (P) Very difficult    Patient's Support Network Includes:  parents    Functional Status: Moderate impairment     Progress toward goal: Not at goal    Prognosis: Good with Ongoing Treatment            Impression/Formulation:    VISIT DIAGNOSIS:     ICD-10-CM ICD-9-CM   1. Post traumatic stress disorder (PTSD)  F43.10 309.81        Patient appeared alert and oriented.  Patient is voluntarily requesting to continue outpatient therapy at [facility].  Patient is receptive to assistance with maintaining a stable lifestyle.  Patient presents with history of PTSD.  Patient is agreeable to attend routine therapy sessions.  Patient expressed desire to maintain stability and participate in the therapeutic process.        Crisis Plan:  If symptoms/behaviors persist, Patient will present to the nearest hospital for an assessment. Advised patient of [facility] ER and assessment services.     Plan:   Patient will continue in individual outpatient therapy with focus on improved functioning and coping skills, maintaining stability, and avoiding decompensation and the need for higher level  of care.    Patient will contact this office, call 911 or present to the nearest emergency room should suicidal or homicidal ideations occur. Provide Cognitive Behavioral Therapy and Solution Focused Therapy to improve functioning, maintain stability, and avoid decompensation and the need for higher level of care.     Return in about 1 weeks, or earlier if symptoms worsen or fail to improve.    Recommended Referrals: none        This document has been electronically signed by Dahiana Villela LCSW  April 24, 2022 16:08 EDT        Part of this note may be an electronic transcription/translation of spoken language to printed text using the Dragon Dictation System.

## 2022-04-14 ENCOUNTER — TELEMEDICINE (OUTPATIENT)
Dept: PSYCHIATRY | Facility: CLINIC | Age: 22
End: 2022-04-14

## 2022-04-14 DIAGNOSIS — F43.10 POST TRAUMATIC STRESS DISORDER (PTSD): Primary | ICD-10-CM

## 2022-04-14 PROCEDURE — 90837 PSYTX W PT 60 MINUTES: CPT | Performed by: SOCIAL WORKER

## 2022-04-14 NOTE — PROGRESS NOTES
[facility]   Follow-up Progress Note     Date: April 26, 2022  Time In: 4:02PM  Time Out: 5:10 PM      PROGRESS NOTE  Data:  Marilee Cooper is a 21 y.o. female presenting to [facility] for follow-up with Dahiana Villela LCSW. The patient is seen remotely using [facility] My Chart. Patient is being seen via telehealth and stated they are in a secure environment for this session.  Patient is located in her home.  The patient's condition being diagnosed/treated is appropriate for telemedicine. The provider identified herself as well as her credentials. The patient and/or patients guardian consent to be seen remotely, and when consent is given they understand that the consent allows for patient identifiable information to be sent to a third party as needed. They may refuse to be seen remotely at any time. The electronic data is encrypted and password protected, and the patient has been advised of the potential risks to privacy not withstanding such measures.    Today Patient presented for follow-up appointment.  She checked in with how things have been she stated she had a recent teeth out and had a bad experience.  Patient completed PCL 5 with a score of 47.  Patient reviewed patterns of problematic thinking.  Patient did really well with explaining why each stuck point felt under which pattern.  Patient had good insight into these patterns.  Patient and provider then introduced challenging beliefs worksheet and reviewed and practiced 1 together and assigned homework.     Clinical Maneuvering/Intervention: CPT    Assisted Patient in processing above session content; acknowledged and normalized patient’s thoughts, feelings, and concerns.  Rationalized patient thought process regarding stuck points.  Discussed triggers associated with patient's PTSD.  Also discussed coping skills for patient to implement such as cognitive  reprocessing.    Allowed Patient to freely discuss issues  without interruption or judgement with unconditional positive regard, active listening skills, and empathy. Therapist provided a safe, confidential environment to facilitate the development of a positive therapeutic relationship and encouraged open, honest communication. Assisted Patient in identifying risk factors which would indicate the need for higher level of care including thoughts to harm self or others and/or self-harming behavior and encouraged Patient to contact this office, call 911, or present to the nearest emergency room should any of these events occur. Discussed crisis intervention services and means to access. Patient adamantly and convincingly denies current suicidal or homicidal ideation or perceptual disturbance. Assisted Patient in processing session content; acknowledged and normalized Patient’s thoughts, feelings, and concerns by utilizing a person-centered approach in efforts to build appropriate rapport and a positive therapeutic relationship with open and honest communication. Therapist utilized dialectical behavior techniques to teach and model emotional regulation and relaxation methods. Therapist assisted Patient with identifying and implementing healthier coping strategies.     Assessment   Patient appears to be maintaining relative stability as compared to baseline.  Patient continues to struggle with PTSD.   As a result, they can be reasonably expected to continue to benefit from treatment and would likely be at increased risk for decompensation otherwise.    Mental Status Exam:   Hygiene:   good  Cooperation:  Cooperative  Eye Contact:  Good  Psychomotor Behavior:  Appropriate  Affect:  Appropriate  Mood: normal  Speech:  Normal  Thought Process:  Goal directed  Thought Content:  Normal  Suicidal:  None  Homicidal:  None  Hallucinations:  None  Delusion:  None  Memory:  Intact  Orientation:  Person, Place, Time and  Situation  Reliability:  good  Insight:  Good  Judgement:  Good  Impulse Control:  Good  Physical/Medical Issues:  No      PHQ-Score Total:  PHQ-9 Total Score:      ADAM-7:  Feeling nervous, anxious or on edge: (P) More than half the days  Not being able to stop or control worrying: (P) Several days  Worrying too much about different things: (P) More than half the days  Trouble Relaxing: (P) More than half the days  Being so restless that it is hard to sit still: (P) Several days  Feeling afraid as if something awful might happen: (P) More than half the days  Becoming easily annoyed or irritable: (P) Nearly every day  ADAM 7 Total Score: (P) 13  If you checked any problems, how difficult have these problems made it for you to do your work, take care of things at home, or get along with other people: (P) Very difficult    Patient's Support Network Includes:  parents    Functional Status: Moderate impairment     Progress toward goal: Not at goal    Prognosis: Good with Ongoing Treatment            Impression/Formulation:    VISIT DIAGNOSIS:     ICD-10-CM ICD-9-CM   1. Post traumatic stress disorder (PTSD)  F43.10 309.81        Patient appeared alert and oriented.  Patient is voluntarily requesting to continue outpatient therapy at [facility].  Patient is receptive to assistance with maintaining a stable lifestyle.  Patient presents with history of PTSD.  Patient is agreeable to attend routine therapy sessions.  Patient expressed desire to maintain stability and participate in the therapeutic process.        Crisis Plan:  If symptoms/behaviors persist, Patient will present to the nearest hospital for an assessment. Advised patient of [facility] ER and assessment services.     Plan:   Patient will continue in individual outpatient therapy with focus on improved functioning and coping skills, maintaining stability, and avoiding decompensation and the need for higher level of  care.    Patient will contact this office, call 911 or present to the nearest emergency room should suicidal or homicidal ideations occur. Provide Cognitive Behavioral Therapy and Solution Focused Therapy to improve functioning, maintain stability, and avoid decompensation and the need for higher level of care.     Return in about 1 weeks, or earlier if symptoms worsen or fail to improve.    Recommended Referrals: none        This document has been electronically signed by Dahiana Villela LCSW  April 26, 2022 07:18 EDT        Part of this note may be an electronic transcription/translation of spoken language to printed text using the Dragon Dictation System.

## 2022-04-18 ENCOUNTER — TELEMEDICINE (OUTPATIENT)
Dept: PSYCHIATRY | Facility: CLINIC | Age: 22
End: 2022-04-18

## 2022-04-18 DIAGNOSIS — F43.10 POST TRAUMATIC STRESS DISORDER (PTSD): Primary | ICD-10-CM

## 2022-04-18 PROCEDURE — 90837 PSYTX W PT 60 MINUTES: CPT | Performed by: SOCIAL WORKER

## 2022-04-18 NOTE — PROGRESS NOTES
[facility]   Follow-up Progress Note     Date: April 19, 2022  Time In: 3:04PM  Time Out: 4:00PM      PROGRESS NOTE  Data:  Marilee Cooper is a 21 y.o. female presenting to [facility] for follow-up with Dahiana Villela LCSW. The patient is seen remotely using [facility] My Chart. Patient is being seen via telehealth and stated they are in a secure environment for this session.  Patient is located in her home.  The patient's condition being diagnosed/treated is appropriate for telemedicine. The provider identified herself as well as her credentials. The patient and/or patients guardian consent to be seen remotely, and when consent is given they understand that the consent allows for patient identifiable information to be sent to a third party as needed. They may refuse to be seen remotely at any time. The electronic data is encrypted and password protected, and the patient has been advised of the potential risks to privacy not withstanding such measures.    Today Patient checked in with how things have been going.  She stated she had a bit of a chaotic weekend following her session last week she stated she was very emotional and she felt like the right thing to do was break-up with her boyfriend.  She stated they did break-up but then she immediately regretted it, they talked the next day and worked it out.  Patient completed PCL 5 with a score 56.  Patient explored challenging beliefs worksheet and stuck points that she completed.  Patient and provider reviewed a safety module and identified safety-related stuck points to complete for her challenging beliefs worksheets.  Patient has good insight into her stuck points.    Clinical Maneuvering/Intervention: CPT    Assisted Patient in processing above session content; acknowledged and normalized patient’s thoughts, feelings, and concerns.  Rationalized patient thought process  regarding stuck points.  Discussed triggers associated with patient's PTSD.  Also discussed coping skills for patient to implement such as cognitive reprocessing.    Allowed Patient to freely discuss issues  without interruption or judgement with unconditional positive regard, active listening skills, and empathy. Therapist provided a safe, confidential environment to facilitate the development of a positive therapeutic relationship and encouraged open, honest communication. Assisted Patient in identifying risk factors which would indicate the need for higher level of care including thoughts to harm self or others and/or self-harming behavior and encouraged Patient to contact this office, call 911, or present to the nearest emergency room should any of these events occur. Discussed crisis intervention services and means to access. Patient adamantly and convincingly denies current suicidal or homicidal ideation or perceptual disturbance. Assisted Patient in processing session content; acknowledged and normalized Patient’s thoughts, feelings, and concerns by utilizing a person-centered approach in efforts to build appropriate rapport and a positive therapeutic relationship with open and honest communication. Therapist utilized dialectical behavior techniques to teach and model emotional regulation and relaxation methods. Therapist assisted Patient with identifying and implementing healthier coping strategies.     Assessment   Patient appears to be maintaining relative stability as compared to baseline.  Patient continues to struggle with PTSD.   As a result, they can be reasonably expected to continue to benefit from treatment and would likely be at increased risk for decompensation otherwise.    Mental Status Exam:   Hygiene:   good  Cooperation:  Cooperative  Eye Contact:  Good  Psychomotor Behavior:  Appropriate  Affect:  Appropriate  Mood: normal  Speech:  Normal  Thought Process:  Goal directed  Thought Content:   Normal  Suicidal:  None  Homicidal:  None  Hallucinations:  None  Delusion:  None  Memory:  Intact  Orientation:  Person, Place, Time and Situation  Reliability:  good  Insight:  Good  Judgement:  Good  Impulse Control:  Good  Physical/Medical Issues:  No      PHQ-Score Total:  PHQ-9 Total Score:      ADAM-7:  Feeling nervous, anxious or on edge: (P) Several days  Not being able to stop or control worrying: (P) Several days  Worrying too much about different things: (P) Several days  Trouble Relaxing: (P) Several days  Being so restless that it is hard to sit still: (P) Several days  Feeling afraid as if something awful might happen: (P) Several days  Becoming easily annoyed or irritable: (P) Several days  ADAM 7 Total Score: (P) 7  If you checked any problems, how difficult have these problems made it for you to do your work, take care of things at home, or get along with other people: (P) Very difficult    Patient's Support Network Includes:  parents    Functional Status: Moderate impairment     Progress toward goal: Not at goal    Prognosis: Good with Ongoing Treatment            Impression/Formulation:    VISIT DIAGNOSIS:     ICD-10-CM ICD-9-CM   1. Post traumatic stress disorder (PTSD)  F43.10 309.81        Patient appeared alert and oriented.  Patient is voluntarily requesting to continue outpatient therapy at [facility].  Patient is receptive to assistance with maintaining a stable lifestyle.  Patient presents with history of PTSD, anxiety, depression.  Patient is agreeable to attend routine therapy sessions.  Patient expressed desire to maintain stability and participate in the therapeutic process.        Crisis Plan:  If symptoms/behaviors persist, Patient will present to the nearest hospital for an assessment. Advised patient of [facility] ER and assessment services.     Plan:   Patient will continue in individual outpatient therapy with focus on improved functioning  and coping skills, maintaining stability, and avoiding decompensation and the need for higher level of care.    Patient will contact this office, call 911 or present to the nearest emergency room should suicidal or homicidal ideations occur. Provide Cognitive Behavioral Therapy and Solution Focused Therapy to improve functioning, maintain stability, and avoid decompensation and the need for higher level of care.     Return in about 1 weeks, or earlier if symptoms worsen or fail to improve.    Recommended Referrals: none        This document has been electronically signed by Dahiana Villela LCSW  April 19, 2022 08:53 EDT        Part of this note may be an electronic transcription/translation of spoken language to printed text using the Dragon Dictation System.

## 2022-04-25 DIAGNOSIS — Z12.11 ENCOUNTER FOR SCREENING COLONOSCOPY: Primary | ICD-10-CM

## 2022-04-28 ENCOUNTER — TELEMEDICINE (OUTPATIENT)
Dept: PSYCHIATRY | Facility: CLINIC | Age: 22
End: 2022-04-28

## 2022-04-28 DIAGNOSIS — F43.10 POST TRAUMATIC STRESS DISORDER (PTSD): Primary | ICD-10-CM

## 2022-04-28 PROCEDURE — 90837 PSYTX W PT 60 MINUTES: CPT | Performed by: SOCIAL WORKER

## 2022-05-05 ENCOUNTER — TELEMEDICINE (OUTPATIENT)
Dept: PSYCHIATRY | Facility: CLINIC | Age: 22
End: 2022-05-05

## 2022-05-05 DIAGNOSIS — F43.10 POST TRAUMATIC STRESS DISORDER (PTSD): Primary | ICD-10-CM

## 2022-05-05 PROCEDURE — 90837 PSYTX W PT 60 MINUTES: CPT | Performed by: SOCIAL WORKER

## 2022-05-05 NOTE — PROGRESS NOTES
[facility]   Follow-up Progress Note     Date: May 23, 2022  Time In: 4:04PM  Time Out: 4:58 PM      PROGRESS NOTE  Data:  Marilee Cooper is a 21 y.o. female presenting to [facility] for follow-up with Dahiana Villela LCSW. The patient is seen remotely using [facility] My Chart. Patient is being seen via telehealth and stated they are in a secure environment for this session.  Patient is located in her car.  The patient's condition being diagnosed/treated is appropriate for telemedicine. The provider identified herself as well as her credentials. The patient and/or patients guardian consent to be seen remotely, and when consent is given they understand that the consent allows for patient identifiable information to be sent to a third party as needed. They may refuse to be seen remotely at any time. The electronic data is encrypted and password protected, and the patient has been advised of the potential risks to privacy not withstanding such measures.    Today Patient presented for follow up appt. patient completed PCL 5 with a score of 40.  Patient and provider reviewed avoidance of homework and lack of consistency.  Patient stated she did complete for worksheets, provider discussed importance of daily practice.  Patient and provider reviewed stuck points and challenging believes worksheets related to trust.  Provider introduced power and control theme and discussed patient's stuck points related to this.  Patient and provider discussed homework assignment and increasing adherence.    Clinical Maneuvering/Intervention: Cognitive processing therapy    Assisted Patient in processing above session content; acknowledged and normalized patient’s thoughts, feelings, and concerns.  Rationalized patient thought process regarding stuck points.  Discussed triggers associated with patient's PTSD.  Also discussed coping skills for patient to  implement such as cognitive reprocessing.    Allowed Patient to freely discuss issues  without interruption or judgement with unconditional positive regard, active listening skills, and empathy. Therapist provided a safe, confidential environment to facilitate the development of a positive therapeutic relationship and encouraged open, honest communication. Assisted Patient in identifying risk factors which would indicate the need for higher level of care including thoughts to harm self or others and/or self-harming behavior and encouraged Patient to contact this office, call 911, or present to the nearest emergency room should any of these events occur. Discussed crisis intervention services and means to access. Patient adamantly and convincingly denies current suicidal or homicidal ideation or perceptual disturbance. Assisted Patient in processing session content; acknowledged and normalized Patient’s thoughts, feelings, and concerns by utilizing a person-centered approach in efforts to build appropriate rapport and a positive therapeutic relationship with open and honest communication. Therapist utilized dialectical behavior techniques to teach and model emotional regulation and relaxation methods. Therapist assisted Patient with identifying and implementing healthier coping strategies.     Assessment   Patient appears to be maintaining relative stability as compared to baseline.  Patient continues to struggle with PTSD.   As a result, they can be reasonably expected to continue to benefit from treatment and would likely be at increased risk for decompensation otherwise.    Mental Status Exam:   Hygiene:   good  Cooperation:  Cooperative  Eye Contact:  Good  Psychomotor Behavior:  Appropriate  Affect:  Appropriate  Mood: normal  Speech:  Normal  Thought Process:  Goal directed  Thought Content:  Normal  Suicidal:  None  Homicidal:  None  Hallucinations:  None  Delusion:  None  Memory:  Intact  Orientation:   Person, Place, Time and Situation  Reliability:  good  Insight:  Good  Judgement:  Good  Impulse Control:  Fair  Physical/Medical Issues:  No      PHQ-Score Total:  PHQ-9 Total Score:      ADAM-7:  Feeling nervous, anxious or on edge: (P) More than half the days  Not being able to stop or control worrying: (P) Several days  Worrying too much about different things: (P) Several days  Trouble Relaxing: (P) More than half the days  Being so restless that it is hard to sit still: (P) Several days  Feeling afraid as if something awful might happen: (P) Nearly every day  Becoming easily annoyed or irritable: (P) Nearly every day  ADAM 7 Total Score: (P) 13  If you checked any problems, how difficult have these problems made it for you to do your work, take care of things at home, or get along with other people: (P) Very difficult    Patient's Support Network Includes:  parents    Functional Status: Moderate impairment     Progress toward goal: Not at goal    Prognosis: Good with Ongoing Treatment            Impression/Formulation:    VISIT DIAGNOSIS:     ICD-10-CM ICD-9-CM   1. Post traumatic stress disorder (PTSD)  F43.10 309.81        Patient appeared alert and oriented.  Patient is voluntarily requesting to continue outpatient therapy at [facility].  Patient is receptive to assistance with maintaining a stable lifestyle.  Patient presents with history of PTSD.  Patient is agreeable to attend routine therapy sessions.  Patient expressed desire to maintain stability and participate in the therapeutic process.        Crisis Plan:  If symptoms/behaviors persist, Patient will present to the nearest hospital for an assessment. Advised patient of [facility] ER and assessment services.     Plan:   Patient will continue in individual outpatient therapy with focus on improved functioning and coping skills, maintaining stability, and avoiding decompensation and the need for higher level of  care.    Patient will contact this office, call 911 or present to the nearest emergency room should suicidal or homicidal ideations occur. Provide Cognitive Behavioral Therapy and Solution Focused Therapy to improve functioning, maintain stability, and avoid decompensation and the need for higher level of care.     Return in about 1 weeks, or earlier if symptoms worsen or fail to improve.    Recommended Referrals: none        This document has been electronically signed by Dahiana Villela LCSW  May 23, 2022 08:09 EDT        Part of this note may be an electronic transcription/translation of spoken language to printed text using the Dragon Dictation System.

## 2022-05-12 ENCOUNTER — TELEMEDICINE (OUTPATIENT)
Dept: PSYCHIATRY | Facility: CLINIC | Age: 22
End: 2022-05-12

## 2022-05-12 DIAGNOSIS — F43.10 POST TRAUMATIC STRESS DISORDER (PTSD): Primary | ICD-10-CM

## 2022-05-12 PROCEDURE — 90837 PSYTX W PT 60 MINUTES: CPT | Performed by: SOCIAL WORKER

## 2022-05-12 NOTE — PROGRESS NOTES
[facility]   Follow-up Progress Note     Date: May 23, 2022  Time In: 4:00PM  Time Out: 5:07pm      PROGRESS NOTE  Data:  Marilee Cooper is a 21 y.o. female presenting to [facility] for follow-up with Dahiana Villela LCSW. The patient is seen remotely using [facility] My Chart. Patient is being seen via telehealth and stated they are in a secure environment for this session.  Patient is located in her home.  The patient's condition being diagnosed/treated is appropriate for telemedicine. The provider identified herself as well as her credentials. The patient and/or patients guardian consent to be seen remotely, and when consent is given they understand that the consent allows for patient identifiable information to be sent to a third party as needed. They may refuse to be seen remotely at any time. The electronic data is encrypted and password protected, and the patient has been advised of the potential risks to privacy not withstanding such measures.    Today Patient presented for follow up appointment.  Patient checked in with how things have been going.  Patient stated she increased her completion of homework and to have a few more worksheets this week.  Patient completed 5 worksheets.  Patient and provider discussed lack of progress on assessment scores.  She completed PCL 5 with a score of 46.  Patient identified areas she is still struggling in and where she has made progress.  Patient and provider discussed possibly adding a few sessions at the end of treatment to address lack of progress.  Patient is gaining insight into stuck points and her thinking patterns.  Patient and provider explored challenging beliefs worksheets.  Patient and provider introduced esteem being and assigned homework.    Clinical Maneuvering/Intervention: Cognitive processing therapy    Assisted Patient in processing above session content;  acknowledged and normalized patient’s thoughts, feelings, and concerns.  Rationalized patient thought process regarding symptoms and progress.  Discussed triggers associated with patient's PTSD.  Also discussed coping skills for patient to implement such as cognitive reprocessing.    Allowed Patient to freely discuss issues  without interruption or judgement with unconditional positive regard, active listening skills, and empathy. Therapist provided a safe, confidential environment to facilitate the development of a positive therapeutic relationship and encouraged open, honest communication. Assisted Patient in identifying risk factors which would indicate the need for higher level of care including thoughts to harm self or others and/or self-harming behavior and encouraged Patient to contact this office, call 911, or present to the nearest emergency room should any of these events occur. Discussed crisis intervention services and means to access. Patient adamantly and convincingly denies current suicidal or homicidal ideation or perceptual disturbance. Assisted Patient in processing session content; acknowledged and normalized Patient’s thoughts, feelings, and concerns by utilizing a person-centered approach in efforts to build appropriate rapport and a positive therapeutic relationship with open and honest communication. Therapist utilized dialectical behavior techniques to teach and model emotional regulation and relaxation methods. Therapist assisted Patient with identifying and implementing healthier coping strategies.     Assessment   Patient appears to be maintaining relative stability as compared to baseline.  Patient continues to struggle with PTSD.   As a result, they can be reasonably expected to continue to benefit from treatment and would likely be at increased risk for decompensation otherwise.    Mental Status Exam:   Hygiene:   good  Cooperation:  Cooperative  Eye Contact:  Good  Psychomotor  Behavior:  Appropriate  Affect:  Appropriate  Mood: normal  Speech:  Normal  Thought Process:  Goal directed  Thought Content:  Normal  Suicidal:  None  Homicidal:  None  Hallucinations:  None  Delusion:  None  Memory:  Intact  Orientation:  Person, Place, Time and Situation  Reliability:  good  Insight:  Good  Judgement:  Fair  Impulse Control:  Fair  Physical/Medical Issues:  No      PHQ-Score Total:  PHQ-9 Total Score:      ADAM-7:  Feeling nervous, anxious or on edge: (P) More than half the days  Not being able to stop or control worrying: (P) More than half the days  Worrying too much about different things: (P) More than half the days  Trouble Relaxing: (P) More than half the days  Being so restless that it is hard to sit still: (P) More than half the days  Feeling afraid as if something awful might happen: (P) More than half the days  Becoming easily annoyed or irritable: (P) More than half the days  ADAM 7 Total Score: (P) 14  If you checked any problems, how difficult have these problems made it for you to do your work, take care of things at home, or get along with other people: (P) Very difficult    Patient's Support Network Includes:  parents    Functional Status: Moderate impairment     Progress toward goal: Not at goal    Prognosis: Good with Ongoing Treatment            Impression/Formulation:    VISIT DIAGNOSIS:     ICD-10-CM ICD-9-CM   1. Post traumatic stress disorder (PTSD)  F43.10 309.81        Patient appeared alert and oriented.  Patient is voluntarily requesting to continue outpatient therapy at [facility].  Patient is receptive to assistance with maintaining a stable lifestyle.  Patient presents with history of PTSD.  Patient is agreeable to attend routine therapy sessions.  Patient expressed desire to maintain stability and participate in the therapeutic process.        Crisis Plan:  If symptoms/behaviors persist, Patient will present to the nearest  hospital for an assessment. Advised patient of [facility] ER and assessment services.     Plan:   Patient will continue in individual outpatient therapy with focus on improved functioning and coping skills, maintaining stability, and avoiding decompensation and the need for higher level of care.    Patient will contact this office, call 911 or present to the nearest emergency room should suicidal or homicidal ideations occur. Provide Cognitive Behavioral Therapy and Solution Focused Therapy to improve functioning, maintain stability, and avoid decompensation and the need for higher level of care.     Return in about 1 weeks, or earlier if symptoms worsen or fail to improve.    Recommended Referrals: none        This document has been electronically signed by Dahiana Villela LCSW  May 23, 2022 08:20 EDT        Part of this note may be an electronic transcription/translation of spoken language to printed text using the Dragon Dictation System.

## 2022-05-19 ENCOUNTER — TELEMEDICINE (OUTPATIENT)
Dept: PSYCHIATRY | Facility: CLINIC | Age: 22
End: 2022-05-19

## 2022-05-19 DIAGNOSIS — F43.10 POST TRAUMATIC STRESS DISORDER (PTSD): Primary | ICD-10-CM

## 2022-05-19 PROCEDURE — 90837 PSYTX W PT 60 MINUTES: CPT | Performed by: SOCIAL WORKER

## 2022-05-19 NOTE — PROGRESS NOTES
[facility]   Follow-up Progress Note     Date: May 31, 2022  Time In: 4:02PM  Time Out: 5:15 PM      PROGRESS NOTE  Data:  Marilee Cooper is a 21 y.o. female presenting to [facility] for follow-up with Dahiana Villela LCSW. The patient is seen remotely using [facility] My Chart. Patient is being seen via telehealth and stated they are in a secure environment for this session.  Patient is located in her home.  The patient's condition being diagnosed/treated is appropriate for telemedicine. The provider identified herself as well as her credentials. The patient and/or patients guardian consent to be seen remotely, and when consent is given they understand that the consent allows for patient identifiable information to be sent to a third party as needed. They may refuse to be seen remotely at any time. The electronic data is encrypted and password protected, and the patient has been advised of the potential risks to privacy not withstanding such measures.    Today Patient prestend for follow up appt. patient completed PCL 5 score 38.  Patient stated she completed all 7 worksheets.  Patient stated she did some of the behavioral assignment of doing nice things for herself but did not practice giving or receiving complements.  Patient provider reviewed challenging beliefs worksheets and ongoing stuck points.  Provider introduced intimacy module and assisted patient in identifying intimacy related stuck points.  Patient and provider discussed upcoming completion of CPT and possibility of adding a few more sessions.  Provider assigned new impact statement as well and challenging believes worksheets to address intimacy related stuck points.    Clinical Maneuvering/Intervention: Cognitive processing therapy    Assisted Patient in processing above session content; acknowledged and normalized patient’s thoughts, feelings, and concerns.   Rationalized patient thought process regarding stuck points.  Discussed triggers associated with patient's PTSD.  Also discussed coping skills for patient to implement such as cognitive reprocessing.    Allowed Patient to freely discuss issues  without interruption or judgement with unconditional positive regard, active listening skills, and empathy. Therapist provided a safe, confidential environment to facilitate the development of a positive therapeutic relationship and encouraged open, honest communication. Assisted Patient in identifying risk factors which would indicate the need for higher level of care including thoughts to harm self or others and/or self-harming behavior and encouraged Patient to contact this office, call 911, or present to the nearest emergency room should any of these events occur. Discussed crisis intervention services and means to access. Patient adamantly and convincingly denies current suicidal or homicidal ideation or perceptual disturbance. Assisted Patient in processing session content; acknowledged and normalized Patient’s thoughts, feelings, and concerns by utilizing a person-centered approach in efforts to build appropriate rapport and a positive therapeutic relationship with open and honest communication. Therapist utilized dialectical behavior techniques to teach and model emotional regulation and relaxation methods. Therapist assisted Patient with identifying and implementing healthier coping strategies.     Assessment   Patient appears to be maintaining relative stability as compared to baseline.  Patient continues to struggle with PTSD.   As a result, they can be reasonably expected to continue to benefit from treatment and would likely be at increased risk for decompensation otherwise.    Mental Status Exam:   Hygiene:   good  Cooperation:  Cooperative  Eye Contact:  Good  Psychomotor Behavior:  Appropriate  Affect:  Appropriate  Mood: normal  Speech:  Normal  Thought  Process:  Goal directed  Thought Content:  Normal  Suicidal:  None  Homicidal:  None  Hallucinations:  None  Delusion:  None  Memory:  Intact  Orientation:  Person, Place, Time and Situation  Reliability:  good  Insight:  Good  Judgement:  Good  Impulse Control:  Good  Physical/Medical Issues:  No      PHQ-Score Total:  PHQ-9 Total Score:      ADAM-7:  Feeling nervous, anxious or on edge: (P) More than half the days  Not being able to stop or control worrying: (P) More than half the days  Worrying too much about different things: (P) More than half the days  Trouble Relaxing: (P) More than half the days  Being so restless that it is hard to sit still: (P) More than half the days  Feeling afraid as if something awful might happen: (P) More than half the days  Becoming easily annoyed or irritable: (P) More than half the days  ADAM 7 Total Score: (P) 14  If you checked any problems, how difficult have these problems made it for you to do your work, take care of things at home, or get along with other people: (P) Very difficult    Patient's Support Network Includes:  parents    Functional Status: Moderate impairment     Progress toward goal: Not at goal    Prognosis: Good with Ongoing Treatment            Impression/Formulation:    VISIT DIAGNOSIS:     ICD-10-CM ICD-9-CM   1. Post traumatic stress disorder (PTSD)  F43.10 309.81        Patient appeared alert and oriented.  Patient is voluntarily requesting to continue outpatient therapy at [facility].  Patient is receptive to assistance with maintaining a stable lifestyle.  Patient presents with history of PTSD.  Patient is agreeable to attend routine therapy sessions.  Patient expressed desire to maintain stability and participate in the therapeutic process.        Crisis Plan:  If symptoms/behaviors persist, Patient will present to the nearest hospital for an assessment. Advised patient of [facility] ER and assessment services.      Plan:   Patient will continue in individual outpatient therapy with focus on improved functioning and coping skills, maintaining stability, and avoiding decompensation and the need for higher level of care.    Patient will contact this office, call 911 or present to the nearest emergency room should suicidal or homicidal ideations occur. Provide Cognitive Behavioral Therapy and Solution Focused Therapy to improve functioning, maintain stability, and avoid decompensation and the need for higher level of care.     Return in about 1 weeks, or earlier if symptoms worsen or fail to improve.    Recommended Referrals: Patient will be referred to a new therapist following completion of treatment due to this writer leaving        This document has been electronically signed by Dahiana Villela LCSW  May 31, 2022 08:16 EDT        Part of this note may be an electronic transcription/translation of spoken language to printed text using the Dragon Dictation System.

## 2022-05-26 ENCOUNTER — TELEMEDICINE (OUTPATIENT)
Dept: PSYCHIATRY | Facility: CLINIC | Age: 22
End: 2022-05-26

## 2022-05-26 DIAGNOSIS — F43.10 POST TRAUMATIC STRESS DISORDER (PTSD): Primary | ICD-10-CM

## 2022-05-26 PROCEDURE — 90837 PSYTX W PT 60 MINUTES: CPT | Performed by: SOCIAL WORKER

## 2022-06-02 ENCOUNTER — TELEMEDICINE (OUTPATIENT)
Dept: PSYCHIATRY | Facility: CLINIC | Age: 22
End: 2022-06-02

## 2022-06-02 DIAGNOSIS — F43.10 POST TRAUMATIC STRESS DISORDER (PTSD): Primary | ICD-10-CM

## 2022-06-02 PROCEDURE — 90837 PSYTX W PT 60 MINUTES: CPT | Performed by: SOCIAL WORKER

## 2022-06-02 NOTE — PROGRESS NOTES
[facility]   Follow-up Progress Note     Date: June 16, 2022  Time In: 4:02PM  Time Out: 5:04 PM      PROGRESS NOTE  Data:  Marilee Cooper is a 22 y.o. female presenting to [facility] for follow-up with Dahiana Villela LCSW. The patient is seen remotely using [facility] My Chart. Patient is being seen via telehealth and stated they are in a secure environment for this session.  Patient is located in her home.  The patient's condition being diagnosed/treated is appropriate for telemedicine. The provider identified herself as well as her credentials. The patient and/or patients guardian consent to be seen remotely, and when consent is given they understand that the consent allows for patient identifiable information to be sent to a third party as needed. They may refuse to be seen remotely at any time. The electronic data is encrypted and password protected, and the patient has been advised of the potential risks to privacy not withstanding such measures.    Today Patient presented for follow-up appointment.  Patient completed PCL 5 with a score of 24.  Patient checked in with how her weekend was.  Patient explored homework and stated she did complete the impact statement.  Patient read her impact statement and explored resolved versus unresolved stuck points.  Patient compared changes in her thinking from beginning of treatment currently.  Patient and provider explored ongoing thoughts points that continue to need maintenance.  Patient and provider explored ways to continue this maintenance following completion of treatment.     clinical Maneuvering/Intervention: CPT    Assisted Patient in processing above session content; acknowledged and normalized patient’s thoughts, feelings, and concerns.  Rationalized patient thought process regarding points.  Discussed triggers associated with patient's PTSD.  Also discussed coping skills  for patient to implement such as thought challenging and replacement.    Allowed Patient to freely discuss issues  without interruption or judgement with unconditional positive regard, active listening skills, and empathy. Therapist provided a safe, confidential environment to facilitate the development of a positive therapeutic relationship and encouraged open, honest communication. Assisted Patient in identifying risk factors which would indicate the need for higher level of care including thoughts to harm self or others and/or self-harming behavior and encouraged Patient to contact this office, call 911, or present to the nearest emergency room should any of these events occur. Discussed crisis intervention services and means to access. Patient adamantly and convincingly denies current suicidal or homicidal ideation or perceptual disturbance. Assisted Patient in processing session content; acknowledged and normalized Patient’s thoughts, feelings, and concerns by utilizing a person-centered approach in efforts to build appropriate rapport and a positive therapeutic relationship with open and honest communication. Therapist utilized dialectical behavior techniques to teach and model emotional regulation and relaxation methods. Therapist assisted Patient with identifying and implementing healthier coping strategies.     Assessment   Patient appears to be maintaining relative stability as compared to baseline.  Patient continues to struggle with PTSD.   As a result, they can be reasonably expected to continue to benefit from treatment and would likely be at increased risk for decompensation otherwise.    Mental Status Exam:   Hygiene:   good  Cooperation:  Cooperative  Eye Contact:  Good  Psychomotor Behavior:  Appropriate  Affect:  Appropriate  Mood: normal  Speech:  Normal  Thought Process:  Goal directed  Thought Content:  Normal  Suicidal:  None  Homicidal:  None  Hallucinations:  None  Delusion:  None  Memory:   Intact  Orientation:  Person, Place, Time and Situation  Reliability:  good  Insight:  Good  Judgement:  Good  Impulse Control:  Fair  Physical/Medical Issues:  No      PHQ-Score Total:  PHQ-9 Total Score:      ADAM-7:  Feeling nervous, anxious or on edge: (P) Several days  Not being able to stop or control worrying: (P) Several days  Worrying too much about different things: (P) Several days  Trouble Relaxing: (P) Several days  Being so restless that it is hard to sit still: (P) Several days  Feeling afraid as if something awful might happen: (P) Several days  Becoming easily annoyed or irritable: (P) More than half the days  ADAM 7 Total Score: (P) 8  If you checked any problems, how difficult have these problems made it for you to do your work, take care of things at home, or get along with other people: (P) Very difficult    Patient's Support Network Includes:  parents    Functional Status: Moderate impairment     Progress toward goal: Not at goal    Prognosis: Good with Ongoing Treatment            Impression/Formulation:    VISIT DIAGNOSIS:     ICD-10-CM ICD-9-CM   1. Post traumatic stress disorder (PTSD)  F43.10 309.81        Patient appeared alert and oriented.  Patient is voluntarily requesting to continue outpatient therapy at [facility].  Patient is receptive to assistance with maintaining a stable lifestyle.  Patient presents with history of PTSD.  Patient is agreeable to attend routine therapy sessions.  Patient expressed desire to maintain stability and participate in the therapeutic process.        Crisis Plan:  If symptoms/behaviors persist, Patient will present to the nearest hospital for an assessment. Advised patient of [facility] ER and assessment services.     Plan:   Patient will continue in individual outpatient therapy with focus on improved functioning and coping skills, maintaining stability, and avoiding decompensation and the need for higher level of  care.    Patient will contact this office, call 911 or present to the nearest emergency room should suicidal or homicidal ideations occur. Provide Cognitive Behavioral Therapy and Solution Focused Therapy to improve functioning, maintain stability, and avoid decompensation and the need for higher level of care.     Return in about 1 weeks, or earlier if symptoms worsen or fail to improve.    Recommended Referrals: Patient will be referred to a new therapist following completion of CPT        This document has been electronically signed by Dahiana Villela LCSW  June 16, 2022 10:30 EDT        Part of this note may be an electronic transcription/translation of spoken language to printed text using the Dragon Dictation System.

## 2022-06-08 ENCOUNTER — TELEMEDICINE (OUTPATIENT)
Dept: PSYCHIATRY | Facility: CLINIC | Age: 22
End: 2022-06-08

## 2022-06-08 DIAGNOSIS — F43.10 POST TRAUMATIC STRESS DISORDER (PTSD): Primary | ICD-10-CM

## 2022-06-08 PROCEDURE — 90834 PSYTX W PT 45 MINUTES: CPT | Performed by: SOCIAL WORKER

## 2022-06-08 NOTE — PROGRESS NOTES
[facility]   Follow-up Progress Note     Date: June 16, 2022  Time In: 12:32pm  Time Out: 1:14 PM      PROGRESS NOTE  Data:  Marilee Cooper is a 22 y.o. female presenting to [facility] for follow-up with Dahiana Villela LCSW. The patient is seen remotely using [facility] My Chart. Patient is being seen via telehealth and stated they are in a secure environment for this session.  Patient is located in her home.  The patient's condition being diagnosed/treated is appropriate for telemedicine. The provider identified herself as well as her credentials. The patient and/or patients guardian consent to be seen remotely, and when consent is given they understand that the consent allows for patient identifiable information to be sent to a third party as needed. They may refuse to be seen remotely at any time. The electronic data is encrypted and password protected, and the patient has been advised of the potential risks to privacy not withstanding such measures.    Today Patient presented for follow-up appointment.  Patient stated she had a stomach flu over the weekend and has not been doing well now.  Patient stated she struggled to do some of the work she because she was becoming emotional.  Patient stated even though this was difficult she was able to complete them.  Patient and provider explored and ongoing stuck points that still need some maintenance.  Patient and provider discussed transition planning next week.    Clinical Maneuvering/Intervention: CBT and transition planning    Assisted Patient in processing above session content; acknowledged and normalized patient’s thoughts, feelings, and concerns.  Rationalized patient thought process regarding stuck points.  Discussed triggers associated with patient's trauma reactions.  Also discussed coping skills for patient to implement such as cognitive reprocessing.    Allowed Patient  to freely discuss issues  without interruption or judgement with unconditional positive regard, active listening skills, and empathy. Therapist provided a safe, confidential environment to facilitate the development of a positive therapeutic relationship and encouraged open, honest communication. Assisted Patient in identifying risk factors which would indicate the need for higher level of care including thoughts to harm self or others and/or self-harming behavior and encouraged Patient to contact this office, call 911, or present to the nearest emergency room should any of these events occur. Discussed crisis intervention services and means to access. Patient adamantly and convincingly denies current suicidal or homicidal ideation or perceptual disturbance. Assisted Patient in processing session content; acknowledged and normalized Patient’s thoughts, feelings, and concerns by utilizing a person-centered approach in efforts to build appropriate rapport and a positive therapeutic relationship with open and honest communication. Therapist utilized dialectical behavior techniques to teach and model emotional regulation and relaxation methods. Therapist assisted Patient with identifying and implementing healthier coping strategies.     Assessment   Patient appears to be maintaining relative stability as compared to baseline.  Patient continues to struggle with trauma reactions.   As a result, they can be reasonably expected to continue to benefit from treatment and would likely be at increased risk for decompensation otherwise.    Mental Status Exam:   Hygiene:   good  Cooperation:  Cooperative  Eye Contact:  Good  Psychomotor Behavior:  Appropriate  Affect:  Appropriate  Mood: normal  Speech:  Normal  Thought Process:  Goal directed  Thought Content:  Normal  Suicidal:  None  Homicidal:  None  Hallucinations:  None  Delusion:  None  Memory:  Intact  Orientation:  Person, Place, Time and Situation  Reliability:   good  Insight:  Good  Judgement:  Good  Impulse Control:  Good  Physical/Medical Issues:  No      PHQ-Score Total:  PHQ-9 Total Score:      ADAM-7:       Patient's Support Network Includes:  parents    Functional Status: Moderate impairment     Progress toward goal: Not at goal    Prognosis: Good with Ongoing Treatment            Impression/Formulation:    VISIT DIAGNOSIS:     ICD-10-CM ICD-9-CM   1. Post traumatic stress disorder (PTSD)  F43.10 309.81        Patient appeared alert and oriented.  Patient is voluntarily requesting to continue outpatient therapy at [facility].  Patient is receptive to assistance with maintaining a stable lifestyle.  Patient presents with history of PTSD.  Patient is agreeable to attend routine therapy sessions.  Patient expressed desire to maintain stability and participate in the therapeutic process.        Crisis Plan:  If symptoms/behaviors persist, Patient will present to the nearest hospital for an assessment. Advised patient of [facility] ER and assessment services.     Plan:   Patient will continue in individual outpatient therapy with focus on improved functioning and coping skills, maintaining stability, and avoiding decompensation and the need for higher level of care.    Patient will contact this office, call 911 or present to the nearest emergency room should suicidal or homicidal ideations occur. Provide Cognitive Behavioral Therapy and Solution Focused Therapy to improve functioning, maintain stability, and avoid decompensation and the need for higher level of care.     Return in about 1 weeks, or earlier if symptoms worsen or fail to improve.    Recommended Referrals: Patient will be referred to new therapist        This document has been electronically signed by Dahiana Villela LCSW  June 16, 2022 12:23 EDT        Part of this note may be an electronic transcription/translation of spoken language to printed text using the Dragon  Dictation System.

## 2022-07-11 ENCOUNTER — TELEMEDICINE (OUTPATIENT)
Dept: PSYCHIATRY | Facility: CLINIC | Age: 22
End: 2022-07-11

## 2022-07-11 DIAGNOSIS — F43.10 POST TRAUMATIC STRESS DISORDER (PTSD): Primary | ICD-10-CM

## 2022-07-11 PROCEDURE — 90834 PSYTX W PT 45 MINUTES: CPT | Performed by: COUNSELOR

## 2022-07-11 NOTE — PROGRESS NOTES
Date: July 11, 2022  Time In: 4:02 pm   Time Out: 4:47 pm   This provider is [facility] [address], [place] [address] using a secure [employer] Video Visit through [email or web address]. Patient is being seen remotely via telehealth at home address in [place] and stated they are in a secure environment for this session. The patient's condition being diagnosed/treated is appropriate for telemedicine. The provider identified herself as well as her credentials. The patient, and/or patients guardian, consent to be seen remotely, and when consent is given they understand that the consent allows for patient identifiable information to be sent to a third party as needed. They may refuse to be seen remotely at any time. The electronic data is encrypted and password protected, and the patient and/or guardian has been advised of the potential risks to privacy not withstanding such measures.     You have chosen to receive care through a telehealth visit.  Do you consent to use a video/audio connection for your medical care today? Yes    PROGRESS NOTE  Data:  Marilee Cooper is a 22 y.o. female who presents today for follow up. Patient is a transfer from another therapist and this is her first appointment. Patient discussed her PTSD diagnosis and what she did with prior therapist regarding processing trauma impact. Patient shared that CPT has been completed.   Patient shared regarding her present situation and functioning across domains. Patient identified feeling like she does not deserve love and also self care. Patient identified that she used to be confident but that she decreased self identify. Patient was able to state and share barriers that she has when looking to her future and what she wants going forward. Patient expressed that she would like to address during therapy.Patient alleviated depressive symptoms, structure and self confidence.        Clinical Maneuvering/Intervention: solution focused, rapport building     (Scales based on 0 - 10 with 10 being the worst)  Depression:  8  Anxiety: 8        Assisted patient in processing above session content; acknowledged and normalized patient’s thoughts, feelings, and concerns.  Rationalized patient thought process regarding challenges with work life balance.  Discussed triggers associated with patient's anxiety, depression and PTSD.  Also discussed coping skills for patient to implement such as writing in journal. Identifying ways for patient to recharge her batteries.     Allowed patient to freely discuss issues without interruption or judgment. Provided safe, confidential environment to facilitate the development of positive therapeutic relationship and encourage open, honest communication. Therapist utilized person centered rapport building techniques while working with patient. Patient transitioned from another therapist and time was spent getting to know patient, identify therapeutic progress and also the continued therapeutic needs.  Assisted patient in identifying risk factors which would indicate the need for higher level of care including thoughts to harm self or others and/or self-harming behavior and encouraged patient to contact this office, call 911, or present to the nearest emergency room should any of these events occur. Discussed crisis intervention services and means to access. Patient adamantly and convincingly denies current suicidal or homicidal ideation or perceptual disturbance.    Assessment:   Assessment   Patient appears to maintain relative stability as compared to their baseline.  However, patient continues to struggle with anxiety, depression and PTSD. which continues to cause impairment in important areas of functioning.  A result, they can be reasonably expected to continue to benefit from treatment and would likely be at increased risk for decompensation  otherwise.    Mental Status Exam:   Hygiene:   good  Cooperation:  Cooperative  Eye Contact:  Good  Psychomotor Behavior:  Appropriate  Affect:  Full range  Mood: normal  Speech:  Normal  Thought Process:  Goal directed  Thought Content:  Normal  Suicidal:  None  Homicidal:  None  Hallucinations:  None  Delusion:  None  Memory:  Intact  Orientation:  Person, Place, Time and Situation  Reliability:  good  Insight:  Good  Judgement:  Good  Impulse Control:  Good  Physical/Medical Issues:  No        Patient's Support Network Includes:  mother and friends    Functional Status: Moderate impairment     Progress toward goal: Not at goal    Prognosis: Good with Ongoing Treatment          Plan:  Patient will continue in individual outpatient therapy with focus on improved functioning and coping skills, maintaining stability, and avoiding decompensation and the need for higher level of care.    Patient will adhere to medication regimen as prescribed and report any side effects. Patient will contact this office, call 911 or present to the nearest emergency room should suicidal or homicidal ideations occur. Provide Cognitive Behavioral Therapy and Solution Focused Therapy to improve functioning, maintain stability, and avoid decompensation and the need for higher level of care.     Return in about 2 weeks, or earlier if symptoms worsen or fail to improve.           VISIT DIAGNOSIS:     ICD-10-CM ICD-9-CM   1. Post traumatic stress disorder (PTSD)  F43.10 309.81             This document has been electronically signed by NICO Lobato  July 11, 2022 16:08 EDT      Part of this note may be an electronic transcription/translation of spoken language to printed text using the Dragon Dictation System.

## 2022-07-26 ENCOUNTER — TELEMEDICINE (OUTPATIENT)
Dept: PSYCHIATRY | Facility: CLINIC | Age: 22
End: 2022-07-26

## 2022-07-26 DIAGNOSIS — F43.10 POST TRAUMATIC STRESS DISORDER (PTSD): Primary | ICD-10-CM

## 2022-07-26 PROCEDURE — 90834 PSYTX W PT 45 MINUTES: CPT | Performed by: COUNSELOR

## 2022-07-27 NOTE — PROGRESS NOTES
Date: July 26, 2022  Time In: 4:05 pm   Time Out: 4:45 pm   This provider is located at the [facility] [address], [place] [address] using a secure [employer] Video Visit through "[employer]. Patient is being seen remotely via telehealth at home address in [place] and stated they are in a secure environment for this session. The patient's condition being diagnosed/treated is appropriate for telemedicine. The provider identified herself as well as her credentials. The patient, and/or patients guardian, consent to be seen remotely, and when consent is given they understand that the consent allows for patient identifiable information to be sent to a third party as needed. They may refuse to be seen remotely at any time. The electronic data is encrypted and password protected, and the patient and/or guardian has been advised of the potential risks to privacy not withstanding such measures.     You have chosen to receive care through a telehealth visit.  Do you consent to use a video/audio connection for your medical care today? Yes    PROGRESS NOTE  Data:  Marilee Cooper is a 22 y.o. female who presents today for follow up.  Patient expressed that things have been going well. Patient noted feelings of being overwhelmed at times.  Patient expressed that she is still presenting with concerns about her future. Patient expressed that she has being doing things that she wants to such as reading and playing board games. Patient identified what use to drive her is not necessarily her focus.Patient identified that she sometimes views herself as an imposter. Patient engages in comparison of self to other people. Patient and provider processed what that means to patient and also how this affects functioning. Patient stated that she is triggered by work and that there are moments of paranoia. Patient noted improvement with symptoms of anxiety and depression.  Patient and provider discussed control.     Clinical Maneuvering/Intervention: CBT    (Scales based on 0 - 10 with 10 being the worst)  Depression:   5-6  Anxiety: 5-6        Assisted patient in processing above session content; acknowledged and normalized patient’s thoughts, feelings, and concerns.  Rationalized patient thought process regarding challenges to self esteem and identify what is important regarding self worth.  Discussed triggers associated with patient's anxiety and depression.  Also discussed coping skills for patient to implement such as challenging cognitions.     Allowed patient to freely discuss issues without interruption or judgment. Provided safe, confidential environment to facilitate the development of positive therapeutic relationship and encourage open, honest communication. Assisted patient in identifying risk factors which would indicate the need for higher level of care including thoughts to harm self or others and/or self-harming behavior and encouraged patient to contact this office, call 911, or present to the nearest emergency room should any of these events occur. Discussed crisis intervention services and means to access. Patient adamantly and convincingly denies current suicidal or homicidal ideation or perceptual disturbance. Patient identified that she is not suicidal but discussed concerns that if she had access something could accidental happen when her anxiety is high.     Assessment:   Assessment   Patient appears to maintain relative stability as compared to their baseline.  However, patient continues to struggle with depression and anxiety symptoms, history of PTSD which continues to cause impairment in important areas of functioning.  A result, they can be reasonably expected to continue to benefit from treatment and would likely be at increased risk for decompensation otherwise.    Mental Status Exam:   Hygiene:   good  Cooperation:  Cooperative  Eye Contact:   Good  Psychomotor Behavior:  Appropriate  Affect:  Full range  Mood: normal  Speech:  Normal  Thought Process:  Goal directed  Thought Content:  Normal  Suicidal:  None  Patient stated not voluntarily discussed that she is always scared that she is going hurt herself   Homicidal:  None  Hallucinations:  None  Delusion:  None  Memory:  Intact  Orientation:  Person, Place, Time and Situation  Reliability:  good  Insight:  Fair  Judgement:  Fair  Impulse Control:  Good and Fair  Physical/Medical Issues:  No        Patient's Support Network Includes:  Boyfriend and parents    Functional Status: Moderate impairment    Progress toward goal: not at goal    Prognosis: good with On-going treatment        Plan:  Patient will continue in individual outpatient therapy with focus on improved functioning and coping skills, maintaining stability, and avoiding decompensation and the need for higher level of care.    Patient will adhere to medication regimen as prescribed and report any side effects. Patient will contact this office, call 911 or present to the nearest emergency room should suicidal or homicidal ideations occur. Provide Cognitive Behavioral Therapy and Solution Focused Therapy to improve functioning, maintain stability, and avoid decompensation and the need for higher level of care.     Return in about 5 weeks, or earlier if symptoms worsen or fail to improve.           VISIT DIAGNOSIS:       ICD-10-CM ICD-9-CM   1. Post traumatic stress disorder (PTSD)  F43.10 309.81              This document has been electronically signed by NICO Lobato  July 26, 2022 16:06 EDT      Part of this note may be an electronic transcription/translation of spoken language to printed text using the Dragon Dictation System.

## 2022-08-25 ENCOUNTER — TELEMEDICINE (OUTPATIENT)
Dept: PSYCHIATRY | Facility: CLINIC | Age: 22
End: 2022-08-25

## 2022-08-25 DIAGNOSIS — F41.1 GENERALIZED ANXIETY DISORDER: ICD-10-CM

## 2022-08-25 DIAGNOSIS — F95.2 TOURETTE SYNDROME: ICD-10-CM

## 2022-08-25 DIAGNOSIS — F42.9 OBSESSIVE-COMPULSIVE DISORDER, UNSPECIFIED TYPE: ICD-10-CM

## 2022-08-25 DIAGNOSIS — F33.1 MAJOR DEPRESSIVE DISORDER, RECURRENT EPISODE, MODERATE: Primary | ICD-10-CM

## 2022-08-25 DIAGNOSIS — F43.10 POST TRAUMATIC STRESS DISORDER (PTSD): ICD-10-CM

## 2022-08-25 PROCEDURE — 90792 PSYCH DIAG EVAL W/MED SRVCS: CPT | Performed by: NURSE PRACTITIONER

## 2022-08-25 RX ORDER — DESVENLAFAXINE SUCCINATE 50 MG/1
50 TABLET, EXTENDED RELEASE ORAL DAILY
Qty: 30 TABLET | Refills: 2 | Status: SHIPPED | OUTPATIENT
Start: 2022-08-25 | End: 2022-10-17 | Stop reason: SDUPTHER

## 2022-08-25 NOTE — PROGRESS NOTES
Patient Name: Marilee Cooper  MRN: [id number]   :  2000     Referring Physician: Kady Webster DO    This provider is located at the [facility] (through [facility]), [address], [address] using a secure [employer] Video Visit through [employer]. Patient is being seen remotely via telehealth at their home address in [place], and stated they are in a secure environment for this session. The patient's condition being diagnosed/treated is appropriate for telemedicine. The provider identified herself as well as her credentials.   The patient, and/or patients guardian, consent to be seen remotely, and when consent is given they understand that the consent allows for patient identifiable information to be sent to a third party as needed.   They may refuse to be seen remotely at any time. The electronic data is encrypted and password protected, and the patient and/or guardian has been advised of the potential risks to privacy not withstanding such measures.    You have chosen to receive care through a telehealth visit.  Do you consent to use a video/audio connection for your medical care today? Yes    Chief Complaint:     ICD-10-CM ICD-9-CM   1. Major depressive disorder, recurrent episode, moderate (HCC)  F33.1 296.32   2. Generalized anxiety disorder  F41.1 300.02   3. Post traumatic stress disorder (PTSD)  F43.10 309.81   4. Obsessive-compulsive disorder, unspecified type  F42.9 300.3   5. Tourette syndrome  F95.2 307.23       HPI:   Marilee Cooper is a 22 y.o. female who is here today for initial evaluation of Anxiety , Depression, Obsessive Compulsive Disorder and PTSD and Tourette's.  Patient was on fluvoxamine for OCD for many years.  Patient states it made her feel like she was zoned out and numb.  Patient did not like the way this made her feel so after many years she quit taking medication.  This was for OCD.  Patient also used to  take baclofen for Tourette's syndrome.  Symptoms of this have not been very strong lately.  Patient states she is has a very hard time with emotional regulation.  Patient states something small will happen and she blows it out of proportion in her reactions.  Feels her reactions are excessive and too much.  By report patient's mom has bipolar disorder and patient's sister has borderline personality disorder.    Past Medical History:   Past Medical History:   Diagnosis Date   • Anemia    • Anxiety    • Back problem    • Bronchitis    • Depression    • Eating disorder    • GERD (gastroesophageal reflux disease)    • Headache    • History of medical problems 2016    Ehlors Danlos Syndrome   • Inflammatory bowel disease    • Irritable bowel syndrome    • Low back pain    • OCD (obsessive compulsive disorder)    • Pneumonia    • Scoliosis    • Substance abuse (HCC)    • Tourette's    • Tourette's        Past Surgical History:   Past Surgical History:   Procedure Laterality Date   • RECONSTRUCTION PECTUS EXCAVATUM / CARINATUM W/ OR W/O THORASCOPY     • RHINOPLASTY     • SEPTOPLASTY     • SINUS SURGERY     • TONSILLECTOMY         Social History:   Social History     Socioeconomic History   • Marital status: Single   Tobacco Use   • Smoking status: Passive Smoke Exposure - Never Smoker   • Smokeless tobacco: Never Used   • Tobacco comment: smokes e cig occas   Vaping Use   • Vaping Use: Some days   Substance and Sexual Activity   • Alcohol use: Yes     Alcohol/week: 4.0 standard drinks     Types: 4 Glasses of wine per week     Comment: social   • Drug use: Yes     Frequency: 7.0 times per week     Types: Marijuana     Comment: 1-2/day   • Sexual activity: Yes     Partners: Male     Birth control/protection: None       Family History:  Family History   Problem Relation Age of Onset   • Arthritis Mother    • Asthma Mother    • Hypothyroidism Mother    • Depression Mother    • Mental illness Mother          Bipolar/anxiety/ocd/adhd   • Thyroid disease Mother         Underactive thyroid   • Hypertension Father    • Cancer Maternal Grandfather    • Early death Maternal Grandfather    • Depression Sister    • Mental illness Sister         BPD/Bipolar/OCD/Anxiety/adhd   • Developmental Disability Paternal Uncle         Downs Syndrome   • Miscarriages / Stillbirths Maternal Grandmother                Allergy:  Allergies   Allergen Reactions   • Metronidazole Nausea Only   • Augmentin [Amoxicillin-Pot Clavulanate] Rash       Current Medications:   Current Outpatient Medications   Medication Sig Dispense Refill   • desvenlafaxine (Pristiq) 50 MG 24 hr tablet Take 1 tablet by mouth Daily. 30 tablet 2   • ibuprofen (ADVIL,MOTRIN) 200 MG tablet Take 400 mg by mouth Every 6 (Six) Hours As Needed for Mild Pain .       No current facility-administered medications for this visit.       Lab Results:   No visits with results within 3 Month(s) from this visit.   Latest known visit with results is:   Hospital Outpatient Visit on 2021   Component Date Value Ref Range Status   • BSA 2021 1.7  m^2 Final   • IVSd 2021 0.52  cm Final   • LVIDd 2021 4.6  cm Final   • LVIDs 2021 3.2  cm Final   • LVPWd 2021 0.79  cm Final   • IVS/LVPW 2021 0.67   Final   • FS 2021 31.5  % Final   • EDV(Teich) 2021 98.9  ml Final   • ESV(Teich) 2021 40.1  ml Final   • EF(Teich) 2021 59.5  % Final   • EDV(cubed) 2021 99.4  ml Final   • ESV(cubed) 2021 31.9  ml Final   • EF(cubed) 2021 67.9  % Final   • LV mass(C)d 2021 92.2  grams Final   • LV mass(C)dI 2021 54.8  grams/m^2 Final   • SV(Teich) 2021 58.8  ml Final   • SI(Teich) 2021 35.0  ml/m^2 Final   • SV(cubed) 2021 67.5  ml Final   • SI(cubed) 2021 40.1  ml/m^2 Final   • Ao root diam 2021 2.8  cm Final   • Ao root area 2021 6.0  cm^2 Final   • LA dimension (2D)   12/12/2021 2.6  cm Final   • asc Aorta Diam 12/12/2021 2.5  cm Final   • LA/Ao 12/12/2021 0.95   Final   • LVOT diam 12/12/2021 2.0  cm Final   • LVOT area 12/12/2021 3.0  cm^2 Final   • LVOT area(traced) 12/12/2021 3.1  cm^2 Final   • LAd major 12/12/2021 4.6  cm Final   • LVLd ap4 12/12/2021 8.4  cm Final   • EDV(MOD-sp4) 12/12/2021 103.0  ml Final   • LVLs ap4 12/12/2021 7.2  cm Final   • ESV(MOD-sp4) 12/12/2021 46.0  ml Final   • EF(MOD-sp4) 12/12/2021 55.3  % Final   • LVLd ap2 12/12/2021 8.1  cm Final   • EDV(MOD-sp2) 12/12/2021 93.7  ml Final   • LVLs ap2 12/12/2021 7.0  cm Final   • ESV(MOD-sp2) 12/12/2021 37.0  ml Final   • EF(MOD-sp2) 12/12/2021 60.5  % Final   • LA ESV (BP) 12/12/2021 24.1  ml Final   • EF(MOD-bp) 12/12/2021 58.4  % Final   • SV(MOD-sp4) 12/12/2021 57.0  ml Final   • SI(MOD-sp4) 12/12/2021 33.9  ml/m^2 Final   • SV(MOD-sp2) 12/12/2021 56.7  ml Final   • SI(MOD-sp2) 12/12/2021 33.7  ml/m^2 Final   • Ao root area (BSA corrected) 12/12/2021 1.6   Final   • LV Slaughter Vol (BSA corrected) 12/12/2021 61.2  ml/m^2 Final   • LV Sys Vol (BSA corrected) 12/12/2021 27.4  ml/m^2 Final   • TAPSE (>1.6) 12/12/2021 2.1  cm Final   • LA ESV Index (BP) 12/12/2021 14.3  ml/m^2 Final   • MV E max jelani 12/12/2021 116.0  cm/sec Final   • MV A max jelani 12/12/2021 34.9  cm/sec Final   • MV E/A 12/12/2021 3.3   Final   • LV IVRT 12/12/2021 0.07  sec Final   • MV V2 max 12/12/2021 108.6  cm/sec Final   • MV max PG 12/12/2021 4.7  mmHg Final   • MV V2 mean 12/12/2021 45.6  cm/sec Final   • MV mean PG 12/12/2021 1.1  mmHg Final   • MV V2 VTI 12/12/2021 32.2  cm Final   • MVA(VTI) 12/12/2021 1.2  cm^2 Final   • MV P1/2t max jelani 12/12/2021 116.3  cm/sec Final   • MV P1/2t 12/12/2021 88.2  msec Final   • MVA(P1/2t) 12/12/2021 2.5  cm^2 Final   • MV dec slope 12/12/2021 386.2  cm/sec^2 Final   • MV dec time 12/12/2021 0.18  sec Final   • Ao pk jelani 12/12/2021 123.3  cm/sec Final   • Ao max PG 12/12/2021 6.0  mmHg Final   •  Ao max PG (full) 12/12/2021 4.4  mmHg Final   • Ao V2 mean 12/12/2021 91.7  cm/sec Final   • Ao mean PG 12/12/2021 3.7  mmHg Final   • Ao mean PG (full) 12/12/2021 2.9  mmHg Final   • Ao V2 VTI 12/12/2021 27.8  cm Final   • ANJELICA(I,A) 12/12/2021 1.4  cm^2 Final   • ANJELICA(I,D) 12/12/2021 1.4  cm^2 Final   • ANJELICA(V,A) 12/12/2021 1.5  cm^2 Final   • ANJELICA(V,D) 12/12/2021 1.5  cm^2 Final   • LV V1 max PG 12/12/2021 1.6  mmHg Final   • LV V1 mean PG 12/12/2021 0.75  mmHg Final   • LV V1 max 12/12/2021 63.7  cm/sec Final   • LV V1 mean 12/12/2021 39.7  cm/sec Final   • LV V1 VTI 12/12/2021 12.6  cm Final   • SV(Ao) 12/12/2021 167.5  ml Final   • SI(Ao) 12/12/2021 99.6  ml/m^2 Final   • SV(LVOT) 12/12/2021 37.7  ml Final   • SI(LVOT) 12/12/2021 22.4  ml/m^2 Final   • PA V2 max 12/12/2021 100.8  cm/sec Final   • PA max PG 12/12/2021 4.1  mmHg Final   • PA acc time 12/12/2021 0.14  sec Final   • TR max chris 12/12/2021 216  cm/sec Final   • TR max PG 12/12/2021 19  mmHg Final   • PA pr(Accel) 12/12/2021 14.8  mmHg Final   • MVA P1/2T LCG 12/12/2021 1.9  cm^2 Final   • RV Base 12/12/2021 3.4  cm Final   • RV Length 12/12/2021 6.4  cm Final   • RV Mid 12/12/2021 2.3  cm Final   • RV S' 12/12/2021 14.7  cm/sec Final   • Lat E/e'  12/12/2021 6.7   Final   • Med E/e' 12/12/2021 8.2   Final   • Lat Peak E' Chris 12/12/2021 17.3  cm/sec Final   • Med Peak E' Chris 12/12/2021 14.2  cm/sec Final   • BH CV ECHO MEERA - BZI_BMI 12/12/2021 21.5  kilograms/m^2 Final   • BH CV ECHO [place]A - BSA([place]) 12/12/2021 1.7  m^2 Final   • BH CV ECHO MEERA - BZI_METRIC_WEIGHT 12/12/2021 60.3  kg Final   • BH CV ECHO MEERA - BZI_METRIC_HEIGHT 12/12/2021 167.6  cm Final   • Avg E/e' ratio 12/12/2021 7.37   Final   • BH CV VAS BP RIGHT ARM 12/12/2021 120/72  mmHg Final   • Ascending aorta 12/12/2021 2.5  cm Final   • IVRT 12/12/2021 74.0  msec Final   • RAP systole 12/12/2021 3  mmHg Final   • RVSP(TR) 12/12/2021 22  mmHg Final   • Echo EF Estimated 12/12/2021 55  %  Final       Review of Symptoms:   Review of Systems   Constitutional: Negative for activity change, appetite change, fatigue, unexpected weight gain and unexpected weight loss.   Respiratory: Negative for shortness of breath and wheezing.    Gastrointestinal: Negative for constipation, diarrhea, nausea and vomiting.   Musculoskeletal: Negative for gait problem.   Skin: Negative for dry skin and rash.   Neurological: Negative for dizziness, speech difficulty, weakness, light-headedness, headache, memory problem and confusion.   Psychiatric/Behavioral: Positive for depressed mood. Negative for agitation, behavioral problems, decreased concentration, dysphoric mood, hallucinations, self-injury, sleep disturbance, suicidal ideas, negative for hyperactivity and stress. The patient is nervous/anxious.        Physical Exam:   Physical Exam  Vitals and nursing note reviewed.   Constitutional:       General: She is not in acute distress.     Appearance: She is well-developed. She is not diaphoretic.   HENT:      Head: Normocephalic and atraumatic.   Eyes:      Conjunctiva/sclera: Conjunctivae normal.   Pulmonary:      Effort: Pulmonary effort is normal. No respiratory distress.   Musculoskeletal:         General: Normal range of motion.      Cervical back: Full passive range of motion without pain and normal range of motion.   Neurological:      Mental Status: She is alert and oriented to person, place, and time.   Psychiatric:         Mood and Affect: Mood is anxious and depressed. Affect is not labile, blunt, angry or inappropriate.         Speech: Speech is not rapid and pressured or tangential.         Behavior: Behavior normal. Behavior is not agitated, slowed, aggressive, withdrawn, hyperactive or combative. Behavior is cooperative.         Thought Content: Thought content normal. Thought content is not paranoid or delusional. Thought content does not include homicidal or suicidal ideation. Thought content does not  include homicidal or suicidal plan.         Judgment: Judgment normal.       There were no vitals taken for this visit.  There is no height or weight on file to calculate BMI. Video appt unable to obtain.     Mental Status Exam:   Appearance: appropriate  Hygiene:   good  Cooperation:  Cooperative  Eye Contact:  Good  Psychomotor Behavior:  Appropriate  Mood:anxious and depressed  Affect:  Appropriate  Hopelessness: Denies  Speech:  Normal  Thought Process:  Goal directed  Thought Content:  Normal  Suicidal:  None  Homicidal:  None  Hallucinations:  None  Delusion:  None  Memory:  Intact  Orientation:  Person, Place, Time and Situation  Reliability:  good  Insight:  Good  Judgement:  Good  Impulse Control:  Good  Physical/Medical Issues:  No     PHQ-9 Depression Screening  Little interest or pleasure in doing things?     Feeling down, depressed, or hopeless?     Trouble falling or staying asleep, or sleeping too much?     Feeling tired or having little energy?     Poor appetite or overeating?     Feeling bad about yourself - or that you are a failure or have let yourself or your family down?     Trouble concentrating on things, such as reading the newspaper or watching television?     Moving or speaking so slowly that other people could have noticed? Or the opposite - being so fidgety or restless that you have been moving around a lot more than usual?     Thoughts that you would be better off dead, or of hurting yourself in some way?     PHQ-9 Total Score     If you checked off any problems, how difficult have these problems made it for you to do your work, take care of things at home, or get along with other people?        Assessment/Plan:   Diagnoses and all orders for this visit:    1. Major depressive disorder, recurrent episode, moderate (HCC) (Primary)  -     desvenlafaxine (Pristiq) 50 MG 24 hr tablet; Take 1 tablet by mouth Daily.  Dispense: 30 tablet; Refill: 2    2. Generalized anxiety disorder  -      desvenlafaxine (Pristiq) 50 MG 24 hr tablet; Take 1 tablet by mouth Daily.  Dispense: 30 tablet; Refill: 2    3. Post traumatic stress disorder (PTSD)    4. Obsessive-compulsive disorder, unspecified type    5. Tourette syndrome    Patient is slightly hesitant about medication as the medication she has taken in the past have made her feel numb.  We will start Pristiq 50 mg daily.    A psychological evaluation was conducted in order to assess past and current level of functioning. Areas assessed included, but were not limited to: perception of social support, perception of ability to face and deal with challenges in life (positive functioning), anxiety symptoms, depressive symptoms, perspective on beliefs/belief system, coping skills for stress, intelligence level,  Therapeutic rapport was established. Interventions conducted today were geared towards incorporating medication management along with support for continued therapy. Education was also provided as to the med management with this provider and what to expect in subsequent sessions.    We discussed risks, benefits,goals and side effects of the above medication and the patient was agreeable with the plan.Patient was educated on the importance of compliance with treatment and follow-up appointments. Patient is aware to contact the [facility] with any worsening of symptoms. To call for questions or concerns and return early if necessary. Patent is agreeable to go to the Emergency Department or call 911 should they begin SI/HI.     Part of this note may be an electronic transcription/translation of spoken language to printed text using the Dragon Dictation System.    Return in about 4 weeks (around 9/22/2022) for Follow Up 30 min, Video visit.    WAYNE Little

## 2022-09-01 ENCOUNTER — TELEMEDICINE (OUTPATIENT)
Dept: PSYCHIATRY | Facility: CLINIC | Age: 22
End: 2022-09-01

## 2022-09-01 DIAGNOSIS — F41.1 GENERALIZED ANXIETY DISORDER: Primary | ICD-10-CM

## 2022-09-01 PROCEDURE — 90834 PSYTX W PT 45 MINUTES: CPT | Performed by: COUNSELOR

## 2022-09-01 NOTE — PROGRESS NOTES
Date: September 2, 2022  Time In: 4:00 pm   Time Out: 4:46 pm   This provider is located at the [facility] ([facility]), [address], [place] [address] using a secure [employer] Video Visit through [employer]. Patient is being seen remotely via telehealth at home address in [place] and stated they are in a secure environment for this session. The patient's condition being diagnosed/treated is appropriate for telemedicine. The provider identified herself as well as her credentials. The patient, and/or patients guardian, consent to be seen remotely, and when consent is given they understand that the consent allows for patient identifiable information to be sent to a third party as needed. They may refuse to be seen remotely at any time. The electronic data is encrypted and password protected, and the patient and/or guardian has been advised of the potential risks to privacy not withstanding such measures.     You have chosen to receive care through a telehealth visit.  Do you consent to use a video/audio connection for your medical care today? Yes    PROGRESS NOTE  Data:  Marilee Copoer is a 22 y.o. female who presents today for follow up. Patient stated that she has started medication and noted that she has been hesitant about starting it. Patient identified that she is struggling with the 9-5. Patient stated that she has been talking about ending the relationship that she is in.  Patient identified and shared conflicting feelings regarding ending or remaining in the relationship with was processed with provider. Patient stated that she is not feeling validated by her boyfriend.Patient discussed that she is looking  the next step. Patient sated that there is an expectation that the next thing to occur is to be .  Patient and provider processed aspects perfectionism versus striving for excellence. Provider shared some of the characteristics  which are sometimes apart of perfectionism and patient noted having some aspects of those. Patient stated that she struggles with feeling judged and wanting others not to feel that she is not true to Patient stated that she that when Covid hit that she had a challenge of self based on social media. Impact of social media.     Chief Complaint: anxiety and depression         Clinical Maneuvering/Intervention: solution focused    (Scales based on 0 - 10 with 10 being the worst)  Depression: Not scaled during contact Anxiety: Not scaled during contact        Assisted patient in processing above session content; acknowledged and normalized patient’s thoughts, feelings, and concerns.  Rationalized patient thought process regarding supports and building connections with those that are local.  Discussed triggers associated with patient's anxiety and depressive symptoms.  Also discussed coping skills for patient to implement such as setting goals that are achievable.     Allowed patient to freely discuss issues without interruption or judgment. Provided safe, confidential environment to facilitate the development of positive therapeutic relationship and encourage open, honest communication. Assisted patient in identifying risk factors which would indicate the need for higher level of care including thoughts to harm self or others and/or self-harming behavior and encouraged patient to contact this office, call 911, or present to the nearest emergency room should any of these events occur. Discussed crisis intervention services and means to access. Patient adamantly and convincingly denies current suicidal or homicidal ideation or perceptual disturbance.    Assessment:   Assessment   Patient appears to maintain relative stability as compared to their baseline.  However, patient continues to struggle with anxiety and depression which continues to cause impairment in important areas of functioning.  A result, they can be  reasonably expected to continue to benefit from treatment and would likely be at increased risk for decompensation otherwise.    Mental Status Exam:   Hygiene:   good  Cooperation:  Cooperative  Eye Contact:  Good  Psychomotor Behavior:  Appropriate  Affect:  Full range  Mood: normal  Speech:  Normal  Thought Process:  Goal directed  Thought Content:  Normal  Suicidal:  Suicidal Ideation Patient stated that she is having some thoughts in passing.   Homicidal:  None  Hallucinations:  None  Delusion:  None  Memory:  Intact  Orientation:  Person, Place, Time and Situation  Reliability:  good  Insight:  Good  Judgement:  Good  Impulse Control:  Good  Physical/Medical Issues:  No        Patient's Support Network Includes:  parents    Functional Status: Moderate impairment     Progress toward goal: Not at goal    Prognosis: Good with Ongoing Treatment          Plan:  Patient will continue in individual outpatient therapy with focus on improved functioning and coping skills, maintaining stability, and avoiding decompensation and the need for higher level of care.    Patient will adhere to medication regimen as prescribed and report any side effects. Patient will contact this office, call 911 or present to the nearest emergency room should suicidal or homicidal ideations occur. Provide Cognitive Behavioral Therapy and Solution Focused Therapy to improve functioning, maintain stability, and avoid decompensation and the need for higher level of care.     Return in about 1 week, or earlier if symptoms worsen or fail to improve.           VISIT DIAGNOSIS:     ICD-10-CM ICD-9-CM   1. Generalized anxiety disorder  F41.1 300.02             This document has been electronically signed by NICO Lobato  September 2, 2022 08:18 EDT      Part of this note may be an electronic transcription/translation of spoken language to printed text using the Dragon Dictation System.

## 2022-09-15 ENCOUNTER — TELEMEDICINE (OUTPATIENT)
Dept: PSYCHIATRY | Facility: CLINIC | Age: 22
End: 2022-09-15

## 2022-09-15 DIAGNOSIS — F95.2 TOURETTE SYNDROME: ICD-10-CM

## 2022-09-15 DIAGNOSIS — F42.9 OBSESSIVE-COMPULSIVE DISORDER, UNSPECIFIED TYPE: ICD-10-CM

## 2022-09-15 DIAGNOSIS — F43.10 POST TRAUMATIC STRESS DISORDER (PTSD): ICD-10-CM

## 2022-09-15 DIAGNOSIS — F33.1 MAJOR DEPRESSIVE DISORDER, RECURRENT EPISODE, MODERATE: ICD-10-CM

## 2022-09-15 DIAGNOSIS — F41.1 GENERALIZED ANXIETY DISORDER: Primary | ICD-10-CM

## 2022-09-15 PROCEDURE — 99214 OFFICE O/P EST MOD 30 MIN: CPT | Performed by: NURSE PRACTITIONER

## 2022-09-22 ENCOUNTER — TELEPHONE (OUTPATIENT)
Dept: EMERGENCY DEPT | Facility: HOSPITAL | Age: 22
End: 2022-09-22

## 2022-09-22 ENCOUNTER — HOSPITAL ENCOUNTER (EMERGENCY)
Facility: HOSPITAL | Age: 22
Discharge: HOME | End: 2022-09-22
Attending: EMERGENCY MEDICINE | Admitting: EMERGENCY MEDICINE

## 2022-09-22 VITALS
HEIGHT: 66 IN | RESPIRATION RATE: 18 BRPM | TEMPERATURE: 100.4 F | DIASTOLIC BLOOD PRESSURE: 74 MMHG | BODY MASS INDEX: 20.09 KG/M2 | HEART RATE: 93 BPM | SYSTOLIC BLOOD PRESSURE: 130 MMHG | OXYGEN SATURATION: 99 % | WEIGHT: 125 LBS

## 2022-09-22 DIAGNOSIS — B34.9 VIRAL SYNDROME: Primary | ICD-10-CM

## 2022-09-22 DIAGNOSIS — J02.9 PHARYNGITIS, UNSPECIFIED ETIOLOGY: ICD-10-CM

## 2022-09-22 LAB
B PARAPERT DNA SPEC QL NAA+PROBE: NOT DETECTED
B PERT DNA SPEC QL NAA+PROBE: NOT DETECTED
C PNEUM DNA NPH QL NAA+NON-PROBE: NOT DETECTED
FLUAV RNA RESP QL NAA+PROBE: NOT DETECTED
FLUAV SUBTYP SPEC NAA+PROBE: NOT DETECTED
FLUBV RNA ISLT QL NAA+PROBE: NOT DETECTED
FLUBV RNA RESP QL NAA+PROBE: NOT DETECTED
HADV DNA SPEC NAA+PROBE: NOT DETECTED
HCOV 229E RNA SPEC QL NAA+PROBE: NOT DETECTED
HCOV HKU1 RNA SPEC QL NAA+PROBE: NOT DETECTED
HCOV NL63 RNA SPEC QL NAA+PROBE: NOT DETECTED
HCOV OC43 RNA SPEC QL NAA+PROBE: NOT DETECTED
HMPV RNA NPH QL NAA+NON-PROBE: NOT DETECTED
HPIV1 RNA ISLT QL NAA+PROBE: NOT DETECTED
HPIV2 RNA SPEC QL NAA+PROBE: NOT DETECTED
HPIV3 RNA NPH QL NAA+PROBE: NOT DETECTED
HPIV4 P GENE NPH QL NAA+PROBE: NOT DETECTED
M PNEUMO IGG SER IA-ACNC: NOT DETECTED
QT INTERVAL: 372 MS
QTC INTERVAL: 462 MS
RHINOVIRUS RNA SPEC NAA+PROBE: NOT DETECTED
RSV RNA NPH QL NAA+NON-PROBE: NOT DETECTED
SARS-COV-2 RNA NPH QL NAA+NON-PROBE: NOT DETECTED
SARS-COV-2 RNA RESP QL NAA+PROBE: NOT DETECTED

## 2022-09-22 PROCEDURE — 93005 ELECTROCARDIOGRAM TRACING: CPT

## 2022-09-22 PROCEDURE — 87636 SARSCOV2 & INF A&B AMP PRB: CPT | Performed by: EMERGENCY MEDICINE

## 2022-09-22 PROCEDURE — C9803 HOPD COVID-19 SPEC COLLECT: HCPCS | Performed by: NURSE PRACTITIONER

## 2022-09-22 PROCEDURE — 93005 ELECTROCARDIOGRAM TRACING: CPT | Performed by: EMERGENCY MEDICINE

## 2022-09-22 PROCEDURE — 99283 EMERGENCY DEPT VISIT LOW MDM: CPT

## 2022-09-22 PROCEDURE — 0202U NFCT DS 22 TRGT SARS-COV-2: CPT | Performed by: NURSE PRACTITIONER

## 2022-09-22 RX ORDER — AZITHROMYCIN 250 MG/1
TABLET, FILM COATED ORAL
Qty: 6 TABLET | Refills: 0 | Status: SHIPPED | OUTPATIENT
Start: 2022-09-22 | End: 2022-09-28

## 2022-09-22 NOTE — DISCHARGE INSTRUCTIONS
Home to rest.  Maintain your very best hydration and nutrition.  Anticipate fever and body aches with Tylenol 1000 mg every 4 hours or and ibuprofen 600 mg every 8 hours.  Add high-dose vitamin C, vitamin D3 and zinc to your daily regimen.  Return to the emergency department as needed for worsening symptoms or concerns.    I will call you within a few hours with the outcome of the respiratory panel swab today.

## 2022-09-22 NOTE — ED PROVIDER NOTES
EMERGENCY DEPARTMENT ENCOUNTER    Pt Name: Marilee Cooper  MRN: [id number]  Pt :   2000  Room Number:  RW4/R4  Date of encounter:  2022  PCP: Kady Webster DO  ED Provider: WAYNE Valente    Historian:  Patient     HPI:  Chief Complaint: Cold symptoms        Context: Marilee Cooper is a 22 y.o. female who presents to the ED c/o awakening  with a mild sore throat followed by increasing discomfort accompanied later by body aches and stiffness and headache.  Patient experienced chest pain today with cough.    Review of systems is negative for fever or chills.  Positive for body aches and mild fatigue.  Positive for chest pain earlier today with cough.  Mild cough and negative for shortness of breath.  GI systems are negative.  She is eating, drinking, and voiding well.   systems are negative.  No profound weakness, dizziness or syncope.  No neurosensory complaint or focal weakness      PAST MEDICAL HISTORY  Past Medical History:   Diagnosis Date   • Anemia    • Anxiety    • Back problem    • Bronchitis    • Depression    • Eating disorder    • GERD (gastroesophageal reflux disease)    • Headache    • History of medical problems 2016    Ehlors Danlos Syndrome   • Inflammatory bowel disease    • Irritable bowel syndrome    • Low back pain    • OCD (obsessive compulsive disorder)    • Pneumonia    • Scoliosis    • Substance abuse (HCC)    • Tourette's    • Tourette's          PAST SURGICAL HISTORY  Past Surgical History:   Procedure Laterality Date   • RECONSTRUCTION PECTUS EXCAVATUM / CARINATUM W/ OR W/O THORASCOPY     • RHINOPLASTY     • SEPTOPLASTY     • SINUS SURGERY     • TONSILLECTOMY           FAMILY HISTORY  Family History   Problem Relation Age of Onset   • Arthritis Mother    • Asthma Mother    • Hypothyroidism Mother    • Depression Mother    • Mental illness Mother         Bipolar/anxiety/ocd/adhd   • Thyroid disease Mother         Underactive thyroid   •  Hypertension Father    • Cancer Maternal Grandfather    • Early death Maternal Grandfather    • Depression Sister    • Mental illness Sister         BPD/Bipolar/OCD/Anxiety/adhd   • Developmental Disability Paternal Uncle         Downs Syndrome   • Miscarriages / Stillbirths Maternal Grandmother                  SOCIAL HISTORY  Social History     Socioeconomic History   • Marital status: Single   Tobacco Use   • Smoking status: Passive Smoke Exposure - Never Smoker   • Smokeless tobacco: Never Used   • Tobacco comment: smokes e cig occas   Vaping Use   • Vaping Use: Some days   Substance and Sexual Activity   • Alcohol use: Yes     Alcohol/week: 4.0 standard drinks     Types: 4 Glasses of wine per week     Comment: social   • Drug use: Yes     Frequency: 7.0 times per week     Types: Marijuana     Comment: 1-2/day   • Sexual activity: Yes     Partners: Male     Birth control/protection: None         ALLERGIES  Metronidazole and Augmentin [amoxicillin-pot clavulanate]        REVIEW OF SYSTEMS  Review of Systems     All systems reviewed and negative except for those discussed in HPI.       PHYSICAL EXAM    I have reviewed the triage vital signs and nursing notes.    ED Triage Vitals [22 1258]   Temp Heart Rate Resp BP SpO2   100.4 °F (38 °C) 93 18 130/74 99 %      Temp src Heart Rate Source Patient Position BP Location FiO2 (%)   Oral Monitor Sitting Right arm --       Physical Exam  GENERAL:   Appears in no acute distress.  Her vital signs are normal though she has a low-grade temp.  She is a very good historian  HENT: Nares patent.  Tympanic membranes are well visualized and clear without effusion.  Posterior pharynx is benign her tonsillar pillars are surgically absent.  She has no exudate or petechiae.  EYES: No scleral icterus.  CV: Regular rhythm, regular rate.  No tachycardia.  No peripheral edema  RESPIRATORY: Normal effort.  No audible wheezes, rales or rhonchi.  ABDOMEN: Soft,  nontender  MUSCULOSKELETAL: No deformities.   NEURO: Alert, moves all extremities, follows commands.  No photophobia or meningeal signs  SKIN: Warm, dry, no rash visualized.        LAB RESULTS  Recent Results (from the past 24 hour(s))   ECG 12 Lead    Collection Time: 09/22/22  1:12 PM   Result Value Ref Range    QT Interval 372 ms    QTC Interval 462 ms   COVID-19 and FLU A/B PCR - Swab, Nasopharynx    Collection Time: 09/22/22  1:22 PM    Specimen: Nasopharynx; Swab   Result Value Ref Range    COVID19 Not Detected Not Detected - Ref. Range    Influenza A PCR Not Detected Not Detected    Influenza B PCR Not Detected Not Detected   Respiratory Panel PCR w/COVID-19(SARS-CoV-2) CHER/WALI/JANE/PAD/COR/MAD/LUCIA In-House, NP Swab in UTM/VTM, 3-4 HR TAT - Swab, Nasopharynx    Collection Time: 09/22/22  1:22 PM    Specimen: Nasopharynx; Swab   Result Value Ref Range    ADENOVIRUS, PCR Not Detected Not Detected    Coronavirus 229E Not Detected Not Detected    Coronavirus HKU1 Not Detected Not Detected    Coronavirus NL63 Not Detected Not Detected    Coronavirus OC43 Not Detected Not Detected    COVID19 Not Detected Not Detected - Ref. Range    Human Metapneumovirus Not Detected Not Detected    Human Rhinovirus/Enterovirus Not Detected Not Detected    Influenza A PCR Not Detected Not Detected    Influenza B PCR Not Detected Not Detected    Parainfluenza Virus 1 Not Detected Not Detected    Parainfluenza Virus 2 Not Detected Not Detected    Parainfluenza Virus 3 Not Detected Not Detected    Parainfluenza Virus 4 Not Detected Not Detected    RSV, PCR Not Detected Not Detected    Bordetella pertussis pcr Not Detected Not Detected    Bordetella parapertussis PCR Not Detected Not Detected    Chlamydophila pneumoniae PCR Not Detected Not Detected    Mycoplasma pneumo by PCR Not Detected Not Detected       If labs were ordered, I independently reviewed the results.        RADIOLOGY  No Radiology Exams Resulted Within Past 24  Hours    PROCEDURES    Procedures    ECG 12 Lead   Final Result   Test Reason : CP   Blood Pressure :   */*   mmHG   Vent. Rate :  93 BPM     Atrial Rate :  93 BPM      P-R Int : 128 ms          QRS Dur :  88 ms       QT Int : 372 ms       P-R-T Axes :  73  85 -16 degrees      QTc Int : 462 ms      Normal sinus rhythm   T wave abnormality, consider inferior ischemia   Prolonged QT   Abnormal ECG   No previous ECGs available   Confirmed by MD Ondina, Richmond ([id number]) on 9/22/2022 7:22:04 PM      Referred By:            Confirmed By: Richmond Nj MD          MEDICATIONS GIVEN IN ER    Medications - No data to display      ED Course as of 09/22/22 2005   Thu Sep 22, 2022   1402 Patient's posterior pharynx appears very benign.  She is able to take and tolerate fluids well.  Her oxygen saturation is outstanding and she denies any shortness of breath.  We discussed parameters for concern that would warrant return to the emergency department.  COVID-19 is nondetected.  Advancing the swab to a respiratory panel and I am going to call [facility] with the outcome of that in a few hours.  In the meantime, patient understands and concurs with this outpatient plan and close follow-up for likely viral syndrome. [MS]   2005 I spoke to Marilee regarding the outcome of her respiratory panel.  I was persuaded that she had a viral illness but with not detection of any respiratory illness on the respiratory panel, I am more willing to initiate a Z-Apollo for her ever-increasing sore throat.  She understands and concurs with this plan.  A prescription has been forwarded to [facility] [MS]      ED Course User Index  [MS] Teetee Marie APRN           AS OF 20:05 EDT VITALS:    BP - 130/74  HR - 93  TEMP - 100.4 °F (38 °C) (Oral)  O2 SATS - 99%                  DIAGNOSIS  Final diagnoses:   Viral syndrome   Pharyngitis, unspecified etiology         DISPOSITION    DISCHARGE    Patient discharged in stable condition.    Reviewed  implications of results, diagnosis, meds, responsibility to follow up, warning signs and symptoms of possible worsening, potential complications and reasons to return to ER.    Patient/Family voiced understanding of above instructions.    Discussed plan for discharge, as there is no emergent indication for admission.  Pt/family is agreeable and understands need for follow up and possible repeat testing.  Pt/family is aware that discharge does not mean that nothing is wrong but that it indicates no emergency is currently present that requires admission and they must continue care with follow-up as given below or with a physician of their choice.     FOLLOW-UP  Kady Webster,   [address] [id number] [phone]    Schedule an appointment as soon as possible for a visit in 2 days  If symptoms worsen         Medication List      New Prescriptions    azithromycin 250 MG tablet  Commonly known as: ZITHROMAX  Take 2 tablets the first day, then 1 tablet daily for 4 days.           Where to Get Your Medications      These medications were sent to [facility] [address] [place] [address] - [address] - [phone]  - [phone]   [address] [facility] [facility] [address]    Phone: [phone]   · azithromycin 250 MG tablet                  Teetee Marie, WAYNE  09/22/22 1405       Teetee Marie, WAYNE  09/22/22 2006

## 2022-09-23 NOTE — TELEPHONE ENCOUNTER
I called Marilee regarding the outcome of her respiratory panel collected earlier today during her ED visit to split flow.  I am calling in a prescription to [facility] on [address] for Z-Apollo.

## 2022-09-28 ENCOUNTER — LAB (OUTPATIENT)
Dept: LAB | Facility: HOSPITAL | Age: 22
End: 2022-09-28

## 2022-09-28 ENCOUNTER — OFFICE VISIT (OUTPATIENT)
Dept: FAMILY MEDICINE CLINIC | Facility: CLINIC | Age: 22
End: 2022-09-28

## 2022-09-28 VITALS
OXYGEN SATURATION: 99 % | BODY MASS INDEX: 19.61 KG/M2 | HEIGHT: 66 IN | DIASTOLIC BLOOD PRESSURE: 80 MMHG | HEART RATE: 63 BPM | SYSTOLIC BLOOD PRESSURE: 120 MMHG | WEIGHT: 122 LBS

## 2022-09-28 DIAGNOSIS — K21.9 GASTROESOPHAGEAL REFLUX DISEASE, UNSPECIFIED WHETHER ESOPHAGITIS PRESENT: ICD-10-CM

## 2022-09-28 DIAGNOSIS — F43.10 PTSD (POST-TRAUMATIC STRESS DISORDER): ICD-10-CM

## 2022-09-28 DIAGNOSIS — D64.9 ANEMIA, UNSPECIFIED TYPE: ICD-10-CM

## 2022-09-28 DIAGNOSIS — R10.9 ABDOMINAL CRAMPING: ICD-10-CM

## 2022-09-28 DIAGNOSIS — K58.2 IRRITABLE BOWEL SYNDROME WITH BOTH CONSTIPATION AND DIARRHEA: ICD-10-CM

## 2022-09-28 DIAGNOSIS — K92.1 HEMATOCHEZIA: Primary | ICD-10-CM

## 2022-09-28 DIAGNOSIS — F42.9 OBSESSIVE-COMPULSIVE DISORDER, UNSPECIFIED TYPE: ICD-10-CM

## 2022-09-28 DIAGNOSIS — K92.1 HEMATOCHEZIA: ICD-10-CM

## 2022-09-28 DIAGNOSIS — F41.1 ANXIETY STATE: ICD-10-CM

## 2022-09-28 DIAGNOSIS — Q79.60 EHLERS-DANLOS SYNDROME: ICD-10-CM

## 2022-09-28 DIAGNOSIS — F95.2 TOURETTE SYNDROME: ICD-10-CM

## 2022-09-28 LAB
ALBUMIN SERPL-MCNC: 5 G/DL (ref 3.5–5.2)
ALBUMIN/GLOB SERPL: 2.4 G/DL
ALP SERPL-CCNC: 57 U/L (ref 39–117)
ALT SERPL W P-5'-P-CCNC: 6 U/L (ref 1–33)
ANION GAP SERPL CALCULATED.3IONS-SCNC: 11 MMOL/L (ref 5–15)
AST SERPL-CCNC: 14 U/L (ref 1–32)
BILIRUB SERPL-MCNC: 0.2 MG/DL (ref 0–1.2)
BUN SERPL-MCNC: 12 MG/DL (ref 6–20)
BUN/CREAT SERPL: 18.8 (ref 7–25)
CALCIUM SPEC-SCNC: 9.9 MG/DL (ref 8.6–10.5)
CHLORIDE SERPL-SCNC: 101 MMOL/L (ref 98–107)
CO2 SERPL-SCNC: 27 MMOL/L (ref 22–29)
CREAT SERPL-MCNC: 0.64 MG/DL (ref 0.57–1)
CRP SERPL-MCNC: <0.3 MG/DL (ref 0–0.5)
DEPRECATED RDW RBC AUTO: 37.9 FL (ref 37–54)
EGFRCR SERPLBLD CKD-EPI 2021: 128.3 ML/MIN/1.73
ERYTHROCYTE [DISTWIDTH] IN BLOOD BY AUTOMATED COUNT: 11.4 % (ref 12.3–15.4)
ERYTHROCYTE [SEDIMENTATION RATE] IN BLOOD: 14 MM/HR (ref 0–20)
GLOBULIN UR ELPH-MCNC: 2.1 GM/DL
GLUCOSE SERPL-MCNC: 47 MG/DL (ref 65–99)
HCT VFR BLD AUTO: 36.8 % (ref 34–46.6)
HGB BLD-MCNC: 12.9 G/DL (ref 12–15.9)
MCH RBC QN AUTO: 31.7 PG (ref 26.6–33)
MCHC RBC AUTO-ENTMCNC: 35.1 G/DL (ref 31.5–35.7)
MCV RBC AUTO: 90.4 FL (ref 79–97)
PLATELET # BLD AUTO: 284 10*3/MM3 (ref 140–450)
PMV BLD AUTO: 10.5 FL (ref 6–12)
POTASSIUM SERPL-SCNC: 4.7 MMOL/L (ref 3.5–5.2)
PROT SERPL-MCNC: 7.1 G/DL (ref 6–8.5)
RBC # BLD AUTO: 4.07 10*6/MM3 (ref 3.77–5.28)
SODIUM SERPL-SCNC: 139 MMOL/L (ref 136–145)
WBC NRBC COR # BLD: 7.28 10*3/MM3 (ref 3.4–10.8)

## 2022-09-28 PROCEDURE — 86231 EMA EACH IG CLASS: CPT

## 2022-09-28 PROCEDURE — 82784 ASSAY IGA/IGD/IGG/IGM EACH: CPT

## 2022-09-28 PROCEDURE — 80053 COMPREHEN METABOLIC PANEL: CPT

## 2022-09-28 PROCEDURE — 83013 H PYLORI (C-13) BREATH: CPT

## 2022-09-28 PROCEDURE — 86364 TISS TRNSGLTMNASE EA IG CLAS: CPT

## 2022-09-28 PROCEDURE — 85027 COMPLETE CBC AUTOMATED: CPT

## 2022-09-28 PROCEDURE — 85652 RBC SED RATE AUTOMATED: CPT

## 2022-09-28 PROCEDURE — 86140 C-REACTIVE PROTEIN: CPT

## 2022-09-28 PROCEDURE — 99214 OFFICE O/P EST MOD 30 MIN: CPT | Performed by: STUDENT IN AN ORGANIZED HEALTH CARE EDUCATION/TRAINING PROGRAM

## 2022-09-28 PROCEDURE — 36415 COLL VENOUS BLD VENIPUNCTURE: CPT

## 2022-09-28 NOTE — PROGRESS NOTES
Established Office Visit      Patient Name: Marilee Cooper  : 2000   MRN: [id number]   Care Team: Patient Care Team:  Kady Webster DO as PCP - General (Internal Medicine)    Chief Complaint:    Chief Complaint   Patient presents with   • Hospital Follow Up Visit     Viral syndrome        History of Present Illness: Marilee Cooper is a 22 y.o. female with EDS (following at [facility]), dyspepsia, IBS, anxiety, OCD, PTSD, tourettes who is here today to follow up with abdominal symptoms and recent ER visit.     She was last seen about 6 months ago. At that time she complained of intermittent hematochezia, abdominal discomfort, IBS-mixed. She was advised to have labs done and referred to GI for EGD/colo. Labs werent completed and she did not follow up with GI due to financial burden. She is now interested in this. Symptoms are still present and remain unchanged since last visit.     She was recently seen in the ER last week with sore throat. Her viral respiratory PCR was negative and she was provided with azithromycin. Symptoms resolved.    anxiety, OCD, PTSD, tourettes - following with behavioral health. She recently started Pristiq about 1 month ago and feels it has been helpful.    She had pap smear at [facility] last year    Subjective      Review of Systems:   Review of Systems - See HPI    I have reviewed and the following portions of the patient's history were updated as appropriate: past family history, past medical history, past social history, past surgical history and problem list.    Medications:     Current Outpatient Medications:   •  desvenlafaxine (Pristiq) 50 MG 24 hr tablet, Take 1 tablet by mouth Daily., Disp: 30 tablet, Rfl: 2  •  ibuprofen (ADVIL,MOTRIN) 200 MG tablet, Take 400 mg by mouth Every 6 (Six) Hours As Needed for Mild Pain ., Disp: , Rfl:     Allergies:   Allergies   Allergen Reactions   • Metronidazole Nausea Only   •  "Augmentin [Amoxicillin-Pot Clavulanate] Rash       Objective     Physical Exam:  Vital Signs:   Vitals:    09/28/22 1013   BP: 120/80   Pulse: 63   SpO2: 99%   Weight: 55.3 kg (122 lb)   Height: 167.6 cm (66\")     Body mass index is 19.69 kg/m².     Physical Exam  Vitals reviewed.   Constitutional:       Appearance: Normal appearance.   Cardiovascular:      Rate and Rhythm: Normal rate and regular rhythm.      Pulses: Normal pulses.      Heart sounds: Normal heart sounds. No murmur heard.  Pulmonary:      Effort: Pulmonary effort is normal. No respiratory distress.      Breath sounds: Normal breath sounds. No wheezing.   Abdominal:      General: Abdomen is flat. There is no distension.      Palpations: Abdomen is soft.      Tenderness: There is no abdominal tenderness.   Skin:     General: Skin is warm and dry.   Neurological:      Mental Status: She is alert.   Psychiatric:         Mood and Affect: Mood normal.         Behavior: Behavior normal.         Judgment: Judgment normal.         Assessment / Plan      Assessment/Plan:   Problems Addressed This Visit  Diagnoses and all orders for this visit:    1. Hematochezia (Primary)  2. Abdominal cramping  3. Gastroesophageal reflux disease, unspecified whether esophagitis present  4. Irritable bowel syndrome with both constipation and diarrhea  Unchanged since last visit. She will plan to get her labs done today (h pylori, inflammatory markers, celiac screening, CBC, CMP). She will also plan to follow up with GI, provided with office number to schedule appointment with Dr. Cedeno. She would benefit from EGD/colonoscopy but prefers to discuss procedures in office prior to moving forward with these.     5. Anxiety state  6. Obsessive-compulsive disorder, unspecified type  7. Tourette syndrome  8. PTSD (post-traumatic stress disorder)  Following with Behavioral Health, improved with Pristiq    9. Michaela-Danlos syndrome  Following with [facility] "         Plan of care reviewed with patient at the conclusion of today's visit. Education was provided regarding diagnosis and management.  Patient verbalizes understanding of and agreement with management plan.    Follow Up:   Return in about 3 months (around 12/28/2022) for Annual.        DO TERRY Hodgson RD  [facility]  [address]  [facility] [address]  Fax [phone]  Phone [phone]

## 2022-09-29 LAB
ENDOMYSIUM IGA SER QL: NEGATIVE
IGA SERPL-MCNC: 146 MG/DL (ref 87–352)
TTG IGA SER-ACNC: <2 U/ML (ref 0–3)
UREA BREATH TEST QL: NEGATIVE

## 2022-10-03 ENCOUNTER — TELEMEDICINE (OUTPATIENT)
Dept: PSYCHIATRY | Facility: CLINIC | Age: 22
End: 2022-10-03

## 2022-10-03 DIAGNOSIS — F33.1 MAJOR DEPRESSIVE DISORDER, RECURRENT EPISODE, MODERATE: Primary | ICD-10-CM

## 2022-10-03 DIAGNOSIS — F41.1 GENERALIZED ANXIETY DISORDER: ICD-10-CM

## 2022-10-03 PROCEDURE — 90834 PSYTX W PT 45 MINUTES: CPT | Performed by: COUNSELOR
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
## 2022-10-17 DIAGNOSIS — F41.1 GENERALIZED ANXIETY DISORDER: ICD-10-CM

## 2022-10-17 DIAGNOSIS — F51.04 PSYCHOPHYSIOLOGICAL INSOMNIA: Primary | ICD-10-CM

## 2022-10-17 DIAGNOSIS — F33.1 MAJOR DEPRESSIVE DISORDER, RECURRENT EPISODE, MODERATE: ICD-10-CM

## 2022-10-17 RX ORDER — DESVENLAFAXINE SUCCINATE 50 MG/1
50 TABLET, EXTENDED RELEASE ORAL DAILY
Qty: 30 TABLET | Refills: 2 | Status: CANCELLED | OUTPATIENT
Start: 2022-10-17

## 2022-10-17 RX ORDER — DESVENLAFAXINE SUCCINATE 50 MG/1
50 TABLET, EXTENDED RELEASE ORAL DAILY
Qty: 30 TABLET | Refills: 3 | Status: SHIPPED | OUTPATIENT
Start: 2022-10-17

## 2022-10-17 RX ORDER — TRAZODONE HYDROCHLORIDE 50 MG/1
TABLET ORAL
Qty: 60 TABLET | Refills: 3 | Status: SHIPPED | OUTPATIENT
Start: 2022-10-17

## 2022-10-17 NOTE — TELEPHONE ENCOUNTER
Patient needs refills. Patient also would like trazadone refilled but it is not on her med list.  Please advise.

## 2022-11-15 ENCOUNTER — LAB (OUTPATIENT)
Dept: LAB | Facility: HOSPITAL | Age: 22
End: 2022-11-15

## 2022-11-15 ENCOUNTER — OFFICE VISIT (OUTPATIENT)
Dept: GASTROENTEROLOGY | Facility: CLINIC | Age: 22
End: 2022-11-15

## 2022-11-15 VITALS
SYSTOLIC BLOOD PRESSURE: 122 MMHG | HEART RATE: 92 BPM | TEMPERATURE: 98.7 F | DIASTOLIC BLOOD PRESSURE: 78 MMHG | BODY MASS INDEX: 19.44 KG/M2 | WEIGHT: 121 LBS | OXYGEN SATURATION: 100 % | HEIGHT: 66 IN

## 2022-11-15 DIAGNOSIS — K52.9 CHRONIC DIARRHEA: ICD-10-CM

## 2022-11-15 DIAGNOSIS — G89.29 CHRONIC ABDOMINAL PAIN: ICD-10-CM

## 2022-11-15 DIAGNOSIS — G89.29 CHRONIC IDIOPATHIC ANAL PAIN: ICD-10-CM

## 2022-11-15 DIAGNOSIS — R10.9 CHRONIC ABDOMINAL PAIN: ICD-10-CM

## 2022-11-15 DIAGNOSIS — K62.89 CHRONIC IDIOPATHIC ANAL PAIN: ICD-10-CM

## 2022-11-15 DIAGNOSIS — G89.29 CHRONIC ABDOMINAL PAIN: Primary | ICD-10-CM

## 2022-11-15 DIAGNOSIS — R10.9 CHRONIC ABDOMINAL PAIN: Primary | ICD-10-CM

## 2022-11-15 DIAGNOSIS — R10.9 STOMACH ACHE: Primary | ICD-10-CM

## 2022-11-15 PROCEDURE — 86140 C-REACTIVE PROTEIN: CPT

## 2022-11-15 PROCEDURE — 86364 TISS TRNSGLTMNASE EA IG CLAS: CPT

## 2022-11-15 PROCEDURE — 36415 COLL VENOUS BLD VENIPUNCTURE: CPT

## 2022-11-15 PROCEDURE — 84439 ASSAY OF FREE THYROXINE: CPT

## 2022-11-15 PROCEDURE — 99204 OFFICE O/P NEW MOD 45 MIN: CPT | Performed by: INTERNAL MEDICINE

## 2022-11-15 PROCEDURE — 80050 GENERAL HEALTH PANEL: CPT

## 2022-11-15 PROCEDURE — 82784 ASSAY IGA/IGD/IGG/IGM EACH: CPT

## 2022-11-15 RX ORDER — DICYCLOMINE HYDROCHLORIDE 10 MG/1
10 CAPSULE ORAL 3 TIMES DAILY PRN
Qty: 90 CAPSULE | Refills: 1 | Status: SHIPPED | OUTPATIENT
Start: 2022-11-15

## 2022-11-15 NOTE — PROGRESS NOTES
PCP: Kady Webster DO    Chief Complaint   Patient presents with   • Abdominal Pain   • Constipation   • Rectal Pain        History of Present Illness:   Marilee Cooper is a 22 y.o. female who presents to the GI clinic as a consult for multiple GI symptoms.  Past h/o IBS-M reportedly followed by UC (data deficient, last seen > 5 years ago) chronic anxiety/ptsd.  Reports a h/o llq pain, chronic daily. Occurs with palpation and eating. She typically has a bm every day, sometimes liquid but caliber can change. She has pain preceding evacuation.  Reports a h/o wt loss in college unintentionally. No fever. Currently maintaining wt and no active GIB loss within past week. 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 Occasional vaping and marijuna upon chart review.    Past Medical History:   Diagnosis Date   • Anemia    • Anxiety    • Back problem    • Bronchitis    • Depression    • Eating disorder    • GERD (gastroesophageal reflux disease)    • Headache    • History of medical problems 2016    Ehlors Danlos Syndrome   • Inflammatory bowel disease    • Irritable bowel syndrome    • Low back pain    • OCD (obsessive compulsive disorder)    • Pneumonia    • Scoliosis    • Substance abuse (HCC)    • Tourette's    • Tourette's        Past Surgical History:   Procedure Laterality Date   • ADENOIDECTOMY     • RECONSTRUCTION PECTUS EXCAVATUM / CARINATUM W/ OR W/O THORASCOPY     • RHINOPLASTY     • SEPTOPLASTY     • SINUS SURGERY     • TONSILLECTOMY           Current Outpatient Medications:   •  desvenlafaxine (Pristiq) 50 MG 24 hr tablet, Take 1 tablet by mouth Daily., Disp: 30 tablet, Rfl: 3  •  traZODone (DESYREL) 50 MG tablet, 1-2 po q hs prn insomnia, Disp: 60 tablet, Rfl: 3    Allergies   Allergen Reactions   • Metronidazole Nausea Only   • Augmentin [Amoxicillin-Pot Clavulanate] Rash       Family History   Problem Relation Age of Onset   • Arthritis Mother    • Asthma Mother    • Hypothyroidism Mother     • Depression Mother    • Mental illness Mother         Bipolar/anxiety/ocd/adhd   • Thyroid disease Mother         Underactive thyroid   • Hypertension Father    • Cancer Maternal Grandfather    • Early death Maternal Grandfather    • Depression Sister    • Mental illness Sister         BPD/Bipolar/OCD/Anxiety/adhd   • Developmental Disability Paternal Uncle         Downs Syndrome   • Miscarriages / Stillbirths Maternal Grandmother                Social History     Socioeconomic History   • Marital status: Single   Tobacco Use   • Smoking status: Never     Passive exposure: Yes   • Smokeless tobacco: Never   • Tobacco comments:     smokes e cig occas   Vaping Use   • Vaping Use: Some days   Substance and Sexual Activity   • Alcohol use: Yes     Alcohol/week: 4.0 standard drinks     Types: 4 Glasses of wine per week     Comment: social   • Drug use: Yes     Frequency: 1.0 times per week     Types: Marijuana     Comment: 1-2/day   • Sexual activity: Yes     Partners: Male     Birth control/protection: None       Review of Systems  All other systems reviewed and are negative.    Vitals:    11/15/22 1436   BP: 122/78   Pulse: 92   Temp: 98.7 °F (37.1 °C)   SpO2: 100%       Physical Exam  General Appearance:  Vitals as above. no acute distress  Head/face:  Normocephalic, atraumatic  Eyes:   EOMI, no conjunctivitis or icterus   Nose/Sinuses:  Nares patent bilaterally without discharge or lesions  Mouth/Throat:  Normal oral movements without dyskinesia  Neck:  trachea is midline, no thyromegaly  Lungs:  Normal work of breathing effort, no overt rales  Heart:  No overt JVD or type VI murmur  Abdomen:  Nondistended, no guarding or rebound tenderness  Neurologic:  Alert; no focal deficits; age appropriate behavior and speech  Psychiatric: mood and affect are congruent  Vascular: extremities without edema  Skin: no rash or cyanosis.      Assessment/Plan  1.) Chronic abdominal pain  Location: llq  Suspect functional  given longstanding symptoms, possible stress component. However, previous features of gib loss and wt loss concerning    Plan:  Bentyl prn, rx sent  EGD  Avoid all non prescribed substances (vaping/marijuana)    2.) Chronic intermittent diarrhea  Suspect IBS D vs IBS M  Plan:  Colonoscopy  Low fodmap diet given  Celiac titers    3.) h/o GIB loss, anemia  egd/colonoscopy    Pee Cedeno MD  11/15/2022

## 2022-11-16 LAB
ALBUMIN SERPL-MCNC: 4.4 G/DL (ref 3.5–5.2)
ALBUMIN/GLOB SERPL: 1.9 G/DL
ALP SERPL-CCNC: 40 U/L (ref 39–117)
ALT SERPL W P-5'-P-CCNC: 9 U/L (ref 1–33)
ANION GAP SERPL CALCULATED.3IONS-SCNC: 11.5 MMOL/L (ref 5–15)
AST SERPL-CCNC: 17 U/L (ref 1–32)
BILIRUB SERPL-MCNC: 0.7 MG/DL (ref 0–1.2)
BUN SERPL-MCNC: 9 MG/DL (ref 6–20)
BUN/CREAT SERPL: 14.1 (ref 7–25)
CALCIUM SPEC-SCNC: 9.3 MG/DL (ref 8.6–10.5)
CHLORIDE SERPL-SCNC: 102 MMOL/L (ref 98–107)
CO2 SERPL-SCNC: 25.5 MMOL/L (ref 22–29)
CREAT SERPL-MCNC: 0.64 MG/DL (ref 0.57–1)
CRP SERPL-MCNC: <0.3 MG/DL (ref 0–0.5)
DEPRECATED RDW RBC AUTO: 41.6 FL (ref 37–54)
EGFRCR SERPLBLD CKD-EPI 2021: 128.3 ML/MIN/1.73
ERYTHROCYTE [DISTWIDTH] IN BLOOD BY AUTOMATED COUNT: 11.9 % (ref 12.3–15.4)
GLOBULIN UR ELPH-MCNC: 2.3 GM/DL
GLUCOSE SERPL-MCNC: 78 MG/DL (ref 65–99)
HCT VFR BLD AUTO: 33.5 % (ref 34–46.6)
HGB BLD-MCNC: 11.2 G/DL (ref 12–15.9)
IGA1 MFR SER: 129 MG/DL (ref 70–400)
MCH RBC QN AUTO: 31.3 PG (ref 26.6–33)
MCHC RBC AUTO-ENTMCNC: 33.4 G/DL (ref 31.5–35.7)
MCV RBC AUTO: 93.6 FL (ref 79–97)
PLATELET # BLD AUTO: 220 10*3/MM3 (ref 140–450)
PMV BLD AUTO: 10.4 FL (ref 6–12)
POTASSIUM SERPL-SCNC: 4.2 MMOL/L (ref 3.5–5.2)
PROT SERPL-MCNC: 6.7 G/DL (ref 6–8.5)
RBC # BLD AUTO: 3.58 10*6/MM3 (ref 3.77–5.28)
SODIUM SERPL-SCNC: 139 MMOL/L (ref 136–145)
T4 FREE SERPL-MCNC: 1.25 NG/DL (ref 0.93–1.7)
TSH SERPL DL<=0.05 MIU/L-ACNC: 0.73 UIU/ML (ref 0.27–4.2)
WBC NRBC COR # BLD: 4.78 10*3/MM3 (ref 3.4–10.8)

## 2022-11-17 DIAGNOSIS — Z12.11 SCREENING FOR COLON CANCER: Primary | ICD-10-CM

## 2022-11-17 LAB — TTG IGA SER-ACNC: <2 U/ML (ref 0–3)

## 2022-11-29 ENCOUNTER — TELEMEDICINE (OUTPATIENT)
Dept: PSYCHIATRY | Facility: CLINIC | Age: 22
End: 2022-11-29

## 2022-11-29 DIAGNOSIS — F41.1 GENERALIZED ANXIETY DISORDER: Primary | ICD-10-CM

## 2022-11-29 DIAGNOSIS — F33.1 MAJOR DEPRESSIVE DISORDER, RECURRENT EPISODE, MODERATE: ICD-10-CM

## 2022-11-29 PROCEDURE — 90834 PSYTX W PT 45 MINUTES: CPT | Performed by: COUNSELOR

## 2022-11-29 NOTE — PROGRESS NOTES
Date: November 29, 2022  Time In: 9:37 am   Time Out: 10:24 am   This provider is located at the [facility]), [address] [place] [address], [place] [address] using a secure [employer] Video Visit through [email or web address]. Patient is being seen remotely via telehealth at home address in [place] and stated they are in a secure environment for this session. The patient's condition being diagnosed/treated is appropriate for telemedicine. The provider identified herself as well as her credentials. The patient, and/or patients guardian, consent to be seen remotely, and when consent is given they understand that the consent allows for patient identifiable information to be sent to a third party as needed. They may refuse to be seen remotely at any time. The electronic data is encrypted and password protected, and the patient and/or guardian has been advised of the potential risks to privacy not withstanding such measures.     You have chosen to receive care through a telehealth visit.  Do you consent to use a video/audio connection for your medical care today? Yes  Technology difficulty and session was conducted via phone.     PROGRESS NOTE  Data:  Marilee Cooper is a 22 y.o. female who presents today for follow up. Patient updated therapist on how life has been since last contact. Patient processed that she has been navigating the holidays. Patient shared that her Thanksgiving has different and that it went well. Patient noted that work has been exhausting. Patient reported that she notices more of a change during the seasonal change. Patient expressed that she is stressing over finances.   Patient explored difficulty with communicating with her boyfriend at times. Patient processed thoughts and feelings related to frustrating but also the learning that she is receiving from the from the experience. Patient reported that she stopped her medication for a period of time,  decreased patience, overwhelmed and overstimulated. Patient processed goals for the next year. Patient processed with therapist regarding thoughts and feelings of social media which continues to be impactful on patients mental health.  Patient processed aspects of judgment.         Chief Complaint: holiday management, anxiety, depressive symptoms    History of Present Illness: on-going       Clinical Maneuvering/Intervention: CBT    (Scales based on 0 - 10 with 10 being the worst)  Depression:   7  Anxiety:  6 consistently        Assisted patient in processing above session content; acknowledged and normalized patient’s thoughts, feelings, and concerns.  Rationalized patient thought process regarding acceptance and preparation for change. Patient identified upcoming changes that may take place and processed thoughts and feelings related to that..  Discussed triggers associated with patient's being overwhelmed and hyperfixating.  Also discussed coping skills for patient to implement such as self care, increasing exercise.     Allowed patient to freely discuss issues without interruption or judgment. Provided safe, confidential environment to facilitate the development of positive therapeutic relationship and encourage open, honest communication. Assisted patient in identifying risk factors which would indicate the need for higher level of care including thoughts to harm self or others and/or self-harming behavior and encouraged patient to contact this office, call 911, or present to the nearest emergency room should any of these events occur. Discussed crisis intervention services and means to access. Patient adamantly and convincingly denies current suicidal or homicidal ideation or perceptual disturbance.    Assessment:   Assessment   Patient appears to maintain relative stability as compared to their baseline.  However, patient continues to struggle with  which continues to cause impairment in important areas of  functioning.  A result, they can be reasonably expected to continue to benefit from treatment and would likely be at increased risk for decompensation otherwise.    Mental Status Exam:   Hygiene:   Conducted via phone  Cooperation:  Conducted via phone  Eye Contact:  Conducted via phone  Psychomotor Behavior:  Conducted via phone  Affect:  Full range  Mood: normal  Speech:  Normal  Thought Process:  Goal directed  Thought Content:  Normal  Suicidal:  None and Patient reported having SI about once a week but is able to rationalize it   Homicidal:  None  Hallucinations:  None  Delusion:  None  Memory:  Intact  Orientation:  Person, Place, Time and Situation  Reliability:  good  Insight:  Good and Fair  Judgement:  Good and Fair  Impulse Control:  Fair  Physical/Medical Issues:  Yes Patient reported having GI problems but is being seen by a doctor.        Patient's Support Network Includes:  significant other and parents    Functional Status: Moderate impairment     Progress toward goal: Not at goal    Prognosis: Good with Ongoing Treatment          Plan:  Patient will continue in individual outpatient therapy with focus on improved functioning and coping skills, maintaining stability, and avoiding decompensation and the need for higher level of care.    Patient will adhere to medication regimen as prescribed and report any side effects. Patient will contact this office, call 911 or present to the nearest emergency room should suicidal or homicidal ideations occur. Provide Cognitive Behavioral Therapy and Solution Focused Therapy to improve functioning, maintain stability, and avoid decompensation and the need for higher level of care.     Return in about 2 weeks, or earlier if symptoms worsen or fail to improve.           VISIT DIAGNOSIS:     ICD-10-CM ICD-9-CM   1. Generalized anxiety disorder  F41.1 300.02   2. Major depressive disorder, recurrent episode, moderate (HCC)  F33.1 296.32             This document has  been electronically signed by NICO Lobato  November 29, 2022 13:22 EST      Part of this note may be an electronic transcription/translation of spoken language to printed text using the Dragon Dictation System.

## 2022-12-09 ENCOUNTER — OUTSIDE FACILITY SERVICE (OUTPATIENT)
Dept: GASTROENTEROLOGY | Facility: CLINIC | Age: 22
End: 2022-12-09

## 2022-12-09 PROCEDURE — 45380 COLONOSCOPY AND BIOPSY: CPT | Performed by: INTERNAL MEDICINE

## 2022-12-09 PROCEDURE — 43239 EGD BIOPSY SINGLE/MULTIPLE: CPT | Performed by: INTERNAL MEDICINE

## 2022-12-09 PROCEDURE — 45385 COLONOSCOPY W/LESION REMOVAL: CPT | Performed by: INTERNAL MEDICINE

## 2022-12-09 PROCEDURE — 88305 TISSUE EXAM BY PATHOLOGIST: CPT

## 2022-12-12 ENCOUNTER — LAB REQUISITION (OUTPATIENT)
Dept: LAB | Facility: HOSPITAL | Age: 22
End: 2022-12-12
Payer: COMMERCIAL

## 2022-12-12 ENCOUNTER — TELEMEDICINE (OUTPATIENT)
Dept: PSYCHIATRY | Facility: CLINIC | Age: 22
End: 2022-12-12

## 2022-12-12 DIAGNOSIS — K22.89 OTHER SPECIFIED DISEASE OF ESOPHAGUS: ICD-10-CM

## 2022-12-12 DIAGNOSIS — Z12.11 ENCOUNTER FOR SCREENING FOR MALIGNANT NEOPLASM OF COLON: ICD-10-CM

## 2022-12-12 DIAGNOSIS — D12.4 BENIGN NEOPLASM OF DESCENDING COLON: ICD-10-CM

## 2022-12-12 DIAGNOSIS — R10.9 UNSPECIFIED ABDOMINAL PAIN: ICD-10-CM

## 2022-12-12 DIAGNOSIS — K92.1 MELENA: ICD-10-CM

## 2022-12-12 DIAGNOSIS — K31.89 OTHER DISEASES OF STOMACH AND DUODENUM: ICD-10-CM

## 2022-12-12 DIAGNOSIS — K64.8 OTHER HEMORRHOIDS: ICD-10-CM

## 2022-12-12 DIAGNOSIS — F41.1 GENERALIZED ANXIETY DISORDER: Primary | ICD-10-CM

## 2022-12-12 DIAGNOSIS — K44.9 DIAPHRAGMATIC HERNIA WITHOUT OBSTRUCTION OR GANGRENE: ICD-10-CM

## 2022-12-12 DIAGNOSIS — F33.1 MAJOR DEPRESSIVE DISORDER, RECURRENT EPISODE, MODERATE: ICD-10-CM

## 2022-12-12 NOTE — PROGRESS NOTES
Date: December 12, 2022  Time In: 4:08 pm   Time Out: 4:53 pm   This provider is located at the [facility]), [address], [place] [address] using a secure [employer] Video Visit through [employer]. Patient is being seen remotely via telehealth at home address in [place] and stated they are in a secure environment for this session. The patient's condition being diagnosed/treated is appropriate for telemedicine. The provider identified herself as well as her credentials. The patient, and/or patients guardian, consent to be seen remotely, and when consent is given they understand that the consent allows for patient identifiable information to be sent to a third party as needed. They may refuse to be seen remotely at any time. The electronic data is encrypted and password protected, and the patient and/or guardian has been advised of the potential risks to privacy not withstanding such measures.     You have chosen to receive care through a telehealth visit.  Do you consent to use a video/audio connection for your medical care today? Yes    PROGRESS NOTE  Data:  Marilee Cooper is a 22 y.o. female who presents today for follow up. Patient updated therapist on life events since last contact. Patient noted that she recently got a promotion and bonus Patient was positive about this due to having financial stressors. Patient explored thoughts related to putting in financial barriers and is identifying a plan in order to decrease debt. Patient reflected on past history family financial issues and that she wants to be different. Patient discussed anxiety that she has been experiencing. Patient discussed health issues which are stressors presently.   Patient explored thoughts and feeling regarding communication with the relationship. Patient stated that it has been affecting her moods and self worth. Patient explored co-dependent feelings and feeling stuck.      Chief Complaint: anxiety    History of Present Illness: on-going       Clinical Maneuvering/Intervention: solution focused     (Scales based on 0 - 10 with 10 being the worst)  Depression: Not scaled during contact  Anxiety: 5        Assisted patient in processing above session content; acknowledged and normalized patient’s thoughts, feelings, and concerns.  Rationalized patient thought process regarding work put in to relationship. Patient explored experiences of feeling gaslit within situations.  Discussed triggers associated with patient's anxiety and self worth.  Also discussed coping skills for patient to implement such as positive communication.    Allowed patient to freely discuss issues without interruption or judgment. Provided safe, confidential environment to facilitate the development of positive therapeutic relationship and encourage open, honest communication. Assisted patient in identifying risk factors which would indicate the need for higher level of care including thoughts to harm self or others and/or self-harming behavior and encouraged patient to contact this office, call 911, or present to the nearest emergency room should any of these events occur. Discussed crisis intervention services and means to access. Patient adamantly and convincingly denies current suicidal or homicidal ideation or perceptual disturbance.    Assessment:   Assessment   Patient appears to maintain relative stability as compared to their baseline.  However, patient continues to struggle with anxiety and depression which continues to cause impairment in important areas of functioning.  A result, they can be reasonably expected to continue to benefit from treatment and would likely be at increased risk for decompensation otherwise.    Mental Status Exam:   Hygiene:   good  Cooperation:  Cooperative  Eye Contact:  Good  Psychomotor Behavior:  Appropriate  Affect:  Full range  Mood: normal  Speech:  Normal  Thought  Process:  Goal directed  Thought Content:  Normal  Suicidal:  Patient reporting having thoughts but that they go away. No intent or plan.      Homicidal:  None  Hallucinations:  None  Delusion:  None  Memory:  Intact  Orientation:  Person, Place, Time and Situation  Reliability:  good  Insight:  Good  Judgement:  Good  Impulse Control:  Good  Physical/Medical Issues:  No        Patient's Support Network Includes:  significant other and mother    Functional Status: Moderate impairment     Progress toward goal: Not at goal    Prognosis: Good with Ongoing Treatment          Plan:  Patient will continue in individual outpatient therapy with focus on improved functioning and coping skills, maintaining stability, and avoiding decompensation and the need for higher level of care.    Patient will adhere to medication regimen as prescribed and report any side effects. Patient will contact this office, call 911 or present to the nearest emergency room should suicidal or homicidal ideations occur. Provide Cognitive Behavioral Therapy and Solution Focused Therapy to improve functioning, maintain stability, and avoid decompensation and the need for higher level of care.     Return in about 2 weeks, or earlier if symptoms worsen or fail to improve.           VISIT DIAGNOSIS:     ICD-10-CM ICD-9-CM   1. Generalized anxiety disorder  F41.1 300.02   2. Major depressive disorder, recurrent episode, moderate (HCC)  F33.1 296.32   3. Post traumatic stress disorder (PTSD)  F43.10 309.81             This document has been electronically signed by NICO Lobato  December 12, 2022 16:40 EST      Part of this note may be an electronic transcription/translation of spoken language to printed text using the Dragon Dictation System.

## 2022-12-13 LAB — REF LAB TEST METHOD: NORMAL

## 2023-01-17 ENCOUNTER — TELEMEDICINE (OUTPATIENT)
Dept: PSYCHIATRY | Facility: CLINIC | Age: 23
End: 2023-01-17
Payer: COMMERCIAL

## 2023-01-17 DIAGNOSIS — F41.1 GENERALIZED ANXIETY DISORDER: Primary | ICD-10-CM

## 2023-01-17 DIAGNOSIS — F33.1 MAJOR DEPRESSIVE DISORDER, RECURRENT EPISODE, MODERATE: ICD-10-CM

## 2023-01-17 PROCEDURE — 90837 PSYTX W PT 60 MINUTES: CPT | Performed by: COUNSELOR

## 2023-01-17 NOTE — PROGRESS NOTES
Date: January 17, 2023  Time In: 4:00 pm   Time Out: 4:56 pm   This provider is located at the [facility] (through [facility]), [address], [place] [address] using a secure [employer] Video Visit through [employer]. Patient is being seen remotely via telehealth at home address in [place] and stated they are in a secure environment for this session. The patient's condition being diagnosed/treated is appropriate for telemedicine. The provider identified herself as well as her credentials. The patient, and/or patients guardian, consent to be seen remotely, and when consent is given they understand that the consent allows for patient identifiable information to be sent to a third party as needed. They may refuse to be seen remotely at any time. The electronic data is encrypted and password protected, and the patient and/or guardian has been advised of the potential risks to privacy not withstanding such measures.     You have chosen to receive care through a telehealth visit.  Do you consent to use a video/audio connection for your medical care today? Yes    PROGRESS NOTE  Data:  Marilee Cooper is a 22 y.o. female who presents today for follow up.  Patient updated therapist on past life events since last contact. Patient processed the holidays and experiences she had during this time.   Patient explored thoughts and feelings that she is experiencing while navigating future goals with her partner. Patient reported that she is testing the commitment within her relationship. Patient processed with therapist past relationship challenges and things that they have worked through over time. Patient reflected that communication has improved and that the interactions are not as aggressive as they have been in the past. Patient reported that she has awareness of control and her need to be in control. Patient shared that she is having some financial stressors. Therapist and  patient processed impulsivity and response.      Chief Complaint: Anxiety and depression,Patient reported that she has been thinking about her relationship and the future. Patient reported that she has been having more anger recently.     History of Present Illness: on-going       Clinical Maneuvering/Intervention: person centered and CBT     (Scales based on 0 - 10 with 10 being the worst)  Depression:  6-7   Anxiety:  7        Assisted patient in processing above session content; acknowledged and normalized patient’s thoughts, feelings, and concerns.  Rationalized patient thought process regarding having some time to herself. Patient expressed that she has awareness of the importance of this within her life.  Patient is now looking to set some boundaries and standing up for herself.  Discussed triggers associated with patient's interactions.  Also discussed coping skills for patient to implement such as taking time away from her phone. Patient  Identified that she will get lost in time such as Tiktok and spending.     Allowed patient to freely discuss issues without interruption or judgment. Provided safe, confidential environment to facilitate the development of positive therapeutic relationship and encourage open, honest communication. Assisted patient in identifying risk factors which would indicate the need for higher level of care including thoughts to harm self or others and/or self-harming behavior and encouraged patient to contact this office, call 911, or present to the nearest emergency room should any of these events occur. Discussed crisis intervention services and means to access. Patient adamantly and convincingly denies current suicidal or homicidal ideation or perceptual disturbance.    Assessment:   Assessment   Patient appears to maintain relative stability as compared to their baseline.  However, patient continues to struggle with anxiety and depression which continues to cause impairment in  important areas of functioning.  A result, they can be reasonably expected to continue to benefit from treatment and would likely be at increased risk for decompensation otherwise.    Mental Status Exam:   Hygiene:   good  Cooperation:  Cooperative  Eye Contact:  Good  Psychomotor Behavior:  Appropriate  Affect:  Full range  Mood: anxious  Speech:  Normal  Thought Process:  Goal directed  Thought Content:  Normal  Suicidal:  Patient reported that since last contact she had some moments of self harm thoughts. Patient stated she had a moment where she almost cut but stopped herself.   Homicidal:  None  Hallucinations:  None  Delusion:  None  Memory:  Intact  Orientation:  Person, Place, Time and Situation  Reliability:  good  Insight:  Good  Judgement:  Good and Fair  Impulse Control:  Fair  Physical/Medical Issues:  No        Patient's Support Network Includes:  significant other and parents    Functional Status: Moderate impairment     Progress toward goal: Not at goal    Prognosis: Fair with Ongoing Treatment          Plan:  Patient will continue in individual outpatient therapy with focus on improved functioning and coping skills, maintaining stability, and avoiding decompensation and the need for higher level of care.    Patient will adhere to medication regimen as prescribed and report any side effects. Patient will contact this office, call 911 or present to the nearest emergency room should suicidal or homicidal ideations occur. Provide Cognitive Behavioral Therapy and Solution Focused Therapy to improve functioning, maintain stability, and avoid decompensation and the need for higher level of care.     Return in about 2 weeks, or earlier if symptoms worsen or fail to improve.           VISIT DIAGNOSIS:     ICD-10-CM ICD-9-CM   1. Generalized anxiety disorder  F41.1 300.02   2. Major depressive disorder, recurrent episode, moderate (HCC)  F33.1 296.32             This document has been electronically signed by  Kadi Byers, [facility]  January 17, 2023 16:44 EST      Part of this note may be an electronic transcription/translation of spoken language to printed text using the Dragon Dictation System.

## 2023-02-02 ENCOUNTER — TELEMEDICINE (OUTPATIENT)
Dept: PSYCHIATRY | Facility: CLINIC | Age: 23
End: 2023-02-02
Payer: COMMERCIAL

## 2023-02-02 DIAGNOSIS — F41.1 GENERALIZED ANXIETY DISORDER: Primary | ICD-10-CM

## 2023-02-02 DIAGNOSIS — F33.1 MAJOR DEPRESSIVE DISORDER, RECURRENT EPISODE, MODERATE: ICD-10-CM

## 2023-02-02 PROCEDURE — 90834 PSYTX W PT 45 MINUTES: CPT | Performed by: COUNSELOR

## 2023-02-02 NOTE — PROGRESS NOTES
Date: February 2, 2023  Time In: 4:05 pm   Time Out: 4:56 pm  This provider is located at the [facility]through [facility]), [address], [place] [address] using a secure [employer] Video Visit through [employer]. Patient is being seen remotely via telehealth at home address in [place] and stated they are in a secure environment for this session. The patient's condition being diagnosed/treated is appropriate for telemedicine. The provider identified herself as well as her credentials. The patient, and/or patients guardian, consent to be seen remotely, and when consent is given they understand that the consent allows for patient identifiable information to be sent to a third party as needed. They may refuse to be seen remotely at any time. The electronic data is encrypted and password protected, and the patient and/or guardian has been advised of the potential risks to privacy not withstanding such measures.     You have chosen to receive care through a telehealth visit.  Do you consent to use a video/audio connection for your medical care today? Yes    PROGRESS NOTE  Data:  Marilee Cooper is a 22 y.o. female who presents today for follow up.  Patient reported that the last two weeks have been chaotic. Patient reported that one of her grandfathers past away. Patient reported that the family is large and that it was challenging to be close to him.Patient reported that she has also broken up with her boyfriend. Patient reported that he became jealous of someone that she follows on [employer]. Patient reported that beginning away from him has been helpful. Patient stated the hardest thing is not feeling anything. Patient and therapist processed characteristics of a good relationship and boyfriend .Patient explored and reported what occurred. Patient shared that   Therapist processed with patient about putting in boundaries.     Chief Complaint: depression, anxiety      History of Present Illness:   Patient reported that she is happy to be in control and not controlled. Patient stated that she has been focused on self judgement and feeling like she is tiptoeing. Patient explored conflicted feelings of being alone or seeking another relationship. Patient reported that she has never not been in a relationship. Patient reported that work is going well. Patient shared that she has been taking more time to explore self care. Patient shared that she has been feeling more guilt instead of anxiety.     Clinical Maneuvering/Intervention: CBT    (Scales based on 0 - 10 with 10 being the worst)  Depression:  Not wanting to do things  Anxiety:           Assisted patient in processing above session content; acknowledged and normalized patient’s thoughts, feelings, and concerns.  Rationalized patient thought process regarding transition experiences that she felt during intimacy with her boyfriend. Patient processed that she did talk with about it..  Discussed triggers associated with patient's past experience.  Also discussed coping skills for patient to implement such as telling others how she feels. Use of effective coping skills and engaging in self care.      Allowed patient to freely discuss issues without interruption or judgment. Provided safe, confidential environment to facilitate the development of positive therapeutic relationship and encourage open, honest communication. Assisted patient in identifying risk factors which would indicate the need for higher level of care including thoughts to harm self or others and/or self-harming behavior and encouraged patient to contact this office, call 911, or present to the nearest emergency room should any of these events occur. Discussed crisis intervention services and means to access. Patient adamantly and convincingly denies current suicidal or homicidal ideation or perceptual disturbance.    Assessment:   Assessment   Patient appears  to maintain relative stability as compared to their baseline.  However, patient continues to struggle with anxiety and depression which continues to cause impairment in important areas of functioning.  A result, they can be reasonably expected to continue to benefit from treatment and would likely be at increased risk for decompensation otherwise.    Mental Status Exam:   Hygiene:   good  Cooperation:  Cooperative  Eye Contact:  Good  Psychomotor Behavior:  Appropriate  Affect:  Full range  Mood: normal  Speech:  Normal  Thought Process:  Goal directed  Thought Content:  Normal  Suicidal:  None Patient reported that the SI has gone away.   Homicidal:  None  Hallucinations:  None  Delusion:  None  Memory:  Intact  Orientation:  Person, Place, Time and Situation  Reliability:  good  Insight:  Fair  Judgement:  Good and Fair  Impulse Control:  Good and Fair  Physical/Medical Issues:  No        Patient's Support Network Includes:  parents and extended family    Functional Status: Moderate impairment     Progress toward goal: Not at goal    Prognosis: Fair with Ongoing Treatment          Plan:  Patient will continue in individual outpatient therapy with focus on improved functioning and coping skills, maintaining stability, and avoiding decompensation and the need for higher level of care.    Patient will adhere to medication regimen as prescribed and report any side effects. Patient will contact this office, call 911 or present to the nearest emergency room should suicidal or homicidal ideations occur. Provide Cognitive Behavioral Therapy and Solution Focused Therapy to improve functioning, maintain stability, and avoid decompensation and the need for higher level of care.     Return in about 5 weeks, or earlier if symptoms worsen or fail to improve. Patient will be moved up to earlier appointment if availability arises.          VISIT DIAGNOSIS:     ICD-10-CM ICD-9-CM   1. Generalized anxiety disorder  F41.1 300.02    2. Major depressive disorder, recurrent episode, moderate (HCC)  F33.1 296.32             This document has been electronically signed by NICO Lobato  February 6, 2023 08:27 EST      Part of this note may be an electronic transcription/translation of spoken language to printed text using the Dragon Dictation System.

## 2023-03-29 ENCOUNTER — TELEMEDICINE (OUTPATIENT)
Dept: PSYCHIATRY | Facility: CLINIC | Age: 23
End: 2023-03-29
Payer: COMMERCIAL

## 2023-03-29 DIAGNOSIS — F41.1 GENERALIZED ANXIETY DISORDER: Primary | ICD-10-CM

## 2023-03-29 DIAGNOSIS — F33.1 MAJOR DEPRESSIVE DISORDER, RECURRENT EPISODE, MODERATE: ICD-10-CM

## 2023-03-29 PROCEDURE — 90837 PSYTX W PT 60 MINUTES: CPT | Performed by: COUNSELOR
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
## 2023-04-10 ENCOUNTER — TELEMEDICINE (OUTPATIENT)
Dept: PSYCHIATRY | Facility: CLINIC | Age: 23
End: 2023-04-10
Payer: COMMERCIAL

## 2023-04-10 DIAGNOSIS — F41.1 GENERALIZED ANXIETY DISORDER: Primary | ICD-10-CM

## 2023-04-10 DIAGNOSIS — F33.1 MAJOR DEPRESSIVE DISORDER, RECURRENT EPISODE, MODERATE: ICD-10-CM

## 2023-04-10 PROCEDURE — 90834 PSYTX W PT 45 MINUTES: CPT | Performed by: COUNSELOR
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
## 2023-05-05 ENCOUNTER — HOSPITAL ENCOUNTER (EMERGENCY)
Facility: HOSPITAL | Age: 23
Discharge: HOME | End: 2023-05-05
Attending: EMERGENCY MEDICINE
Payer: COMMERCIAL

## 2023-05-05 VITALS
WEIGHT: 125 LBS | TEMPERATURE: 98.3 F | DIASTOLIC BLOOD PRESSURE: 68 MMHG | OXYGEN SATURATION: 100 % | RESPIRATION RATE: 16 BRPM | HEART RATE: 64 BPM | HEIGHT: 66 IN | SYSTOLIC BLOOD PRESSURE: 116 MMHG | BODY MASS INDEX: 20.09 KG/M2

## 2023-05-05 DIAGNOSIS — Z87.19 HISTORY OF IBS: ICD-10-CM

## 2023-05-05 DIAGNOSIS — L29.9 PRURITUS: ICD-10-CM

## 2023-05-05 DIAGNOSIS — L23.9 ALLERGIC DERMATITIS: Primary | ICD-10-CM

## 2023-05-05 DIAGNOSIS — Z86.59 HISTORY OF OCD (OBSESSIVE COMPULSIVE DISORDER): ICD-10-CM

## 2023-05-05 DIAGNOSIS — F95.2 TOURETTE'S: ICD-10-CM

## 2023-05-05 PROCEDURE — 63710000001 PREDNISONE PER 1 MG: Performed by: PHYSICIAN ASSISTANT

## 2023-05-05 PROCEDURE — 99283 EMERGENCY DEPT VISIT LOW MDM: CPT

## 2023-05-05 RX ORDER — FAMOTIDINE 20 MG/1
20 TABLET, FILM COATED ORAL ONCE
Status: COMPLETED | OUTPATIENT
Start: 2023-05-05 | End: 2023-05-05

## 2023-05-05 RX ORDER — PREDNISONE 20 MG/1
40 TABLET ORAL ONCE
Status: COMPLETED | OUTPATIENT
Start: 2023-05-05 | End: 2023-05-05

## 2023-05-05 RX ORDER — FAMOTIDINE 20 MG/1
20 TABLET, FILM COATED ORAL 2 TIMES DAILY
Qty: 14 TABLET | Refills: 0 | Status: SHIPPED | OUTPATIENT
Start: 2023-05-05

## 2023-05-05 RX ORDER — HYDROXYZINE PAMOATE 25 MG/1
25 CAPSULE ORAL EVERY 6 HOURS PRN
Qty: 30 CAPSULE | Refills: 0 | Status: SHIPPED | OUTPATIENT
Start: 2023-05-05

## 2023-05-05 RX ORDER — METHYLPREDNISOLONE 4 MG/1
TABLET ORAL
Qty: 21 TABLET | Refills: 0 | Status: SHIPPED | OUTPATIENT
Start: 2023-05-05

## 2023-05-05 RX ADMIN — FAMOTIDINE 20 MG: 20 TABLET, FILM COATED ORAL at 23:16

## 2023-05-05 RX ADMIN — PREDNISONE 40 MG: 20 TABLET ORAL at 23:16

## 2023-05-06 NOTE — ED PROVIDER NOTES
Subjective   History of Present Illness  This is a 22-year-old female that presents the ER with pruritic, maculopapular rash to trunk and extremities, mostly lower extremities x3 weeks.  Patient initially had tried a new detergent.  When she noticed the rash, she washed all of her clothes and her previous detergent and still continued to have pruritic rash.  She also has 3 indoor cats and one of the cats was scratching it self.  She took the cat to the vet and was told that they do not have fleas.  Patient is also concerned because she was seeing a bri that lived with 3 or 4 other male roommates and she slept at his house.  She was worried that maybe she got scabies.  Patient denies any history of frequent allergic reactions.  She denies any new foods or other new exposures.  She has never had allergy testing.  She has been taking Aveeno oatmeal baths, washing with sulfur soap, using Zyrtec by mouth and also utilizing calamine lotion without relief.  Patient denies any difficulty swallowing or oral or facial swelling.  She denies any wheezing or shortness of breath.  Past medical history is significant for Tourette's, GERD, OCD, depression, IBS, scoliosis, anemia, anxiety, history of eating disorder, IBS, and history of substance abuse.    History provided by:  Patient  Rash  Location:  Leg, torso and shoulder/arm  Shoulder/arm rash location:  L upper arm and R upper arm  Torso rash location:  Lower back (abdomen)  Leg rash location:  L upper leg, R upper leg, L lower leg and R lower leg  Quality: itchiness    Duration:  3 weeks  Timing:  Constant  Progression:  Unchanged  Chronicity:  New  Context: animal contact and new detergent/soap    Context: not exposure to similar rash    Context comment:  Pt has had pruritic rash to trunk/extremities.  Pt has indoor cats, but she took to the vet and they didn't have fleas.  New detergent, but pt changed to previous one and still itching.  Relieved by:  Nothing  Worsened by:   Nothing  Ineffective treatments: Oatmeal baths, using sulfur soap, Calamine lotion, Zyrtec po.  Associated symptoms: no abdominal pain, no diarrhea, no fatigue, no fever, no headaches, no hoarse voice, no joint pain, no myalgias, no nausea, no shortness of breath, no throat swelling, no tongue swelling, not vomiting and not wheezing        Review of Systems   Constitutional: Negative.  Negative for activity change, appetite change, chills, diaphoresis, fatigue and fever.   HENT: Negative.  Negative for congestion, facial swelling, hoarse voice, postnasal drip, rhinorrhea and trouble swallowing.    Respiratory: Negative.  Negative for cough, shortness of breath and wheezing.    Gastrointestinal: Negative for abdominal pain, diarrhea, nausea and vomiting.   Genitourinary: Negative.         LMP: 2 days ago.   Musculoskeletal: Negative.  Negative for arthralgias, back pain, joint swelling and myalgias.   Skin: Positive for rash (Maculopapular, pruritic rash to the trunk and extremities, most notably the lower extremities.). Negative for color change.   Neurological: Negative.  Negative for headaches.   All other systems reviewed and are negative.      Past Medical History:   Diagnosis Date   • Anemia    • Anxiety    • Back problem    • Bronchitis    • Depression    • Eating disorder    • GERD (gastroesophageal reflux disease)    • Headache    • History of medical problems 2016    Ehlors Danlos Syndrome   • Inflammatory bowel disease    • Irritable bowel syndrome    • Low back pain    • OCD (obsessive compulsive disorder)    • Pneumonia    • Scoliosis    • Substance abuse    • Tourette's    • Tourette's        Allergies   Allergen Reactions   • Metronidazole Nausea Only   • Augmentin [Amoxicillin-Pot Clavulanate] Rash       Past Surgical History:   Procedure Laterality Date   • ADENOIDECTOMY     • RECONSTRUCTION PECTUS EXCAVATUM / CARINATUM W/ OR W/O THORASCOPY     • RHINOPLASTY     • SEPTOPLASTY     • SINUS SURGERY      • TONSILLECTOMY         Family History   Problem Relation Age of Onset   • Arthritis Mother    • Asthma Mother    • Hypothyroidism Mother    • Depression Mother    • Mental illness Mother         Bipolar/anxiety/ocd/adhd   • Thyroid disease Mother         Underactive thyroid   • Hypertension Father    • Cancer Maternal Grandfather    • Early death Maternal Grandfather    • Depression Sister    • Mental illness Sister         BPD/Bipolar/OCD/Anxiety/adhd   • Developmental Disability Paternal Uncle         Downs Syndrome   • Miscarriages / Stillbirths Maternal Grandmother                Social History     Socioeconomic History   • Marital status: Single   Tobacco Use   • Smoking status: Never     Passive exposure: Yes   • Smokeless tobacco: Never   • Tobacco comments:     smokes e cig occas   Vaping Use   • Vaping Use: Some days   Substance and Sexual Activity   • Alcohol use: Yes     Alcohol/week: 4.0 standard drinks     Types: 4 Glasses of wine per week     Comment: social   • Drug use: Yes     Frequency: 1.0 times per week     Types: Marijuana     Comment: 1-2/day   • Sexual activity: Yes     Partners: Male     Birth control/protection: None           Objective   Physical Exam  Vitals and nursing note reviewed.   Constitutional:       General: She is not in acute distress.     Appearance: Normal appearance. She is not ill-appearing, toxic-appearing or diaphoretic.      Comments: Thin build.  Patient resting comfortably.  No acute sign of pain or distress.  Nontoxic.   HENT:      Head: Normocephalic and atraumatic.      Nose: Nose normal.      Mouth/Throat:      Mouth: Mucous membranes are moist. No angioedema.      Pharynx: Oropharynx is clear. No pharyngeal swelling, posterior oropharyngeal erythema or uvula swelling.      Comments: Oral mucous membranes are moist.  No evidence of angioedema.  Posterior pharynx is clear and patent.  No difficulty swallowing.  Eyes:      Extraocular Movements: Extraocular  movements intact.      Conjunctiva/sclera: Conjunctivae normal.      Pupils: Pupils are equal, round, and reactive to light.   Cardiovascular:      Rate and Rhythm: Normal rate and regular rhythm.  No extrasystoles are present.     Pulses: Normal pulses.      Heart sounds: Normal heart sounds.      Comments: Regular rate and rhythm.  No ectopy.  Pulmonary:      Effort: Pulmonary effort is normal. No tachypnea, accessory muscle usage or retractions.      Breath sounds: Normal breath sounds. No stridor. No wheezing.      Comments: No tachypnea or retractions or accessory muscle use.  No wheezes or stridor.  Good air exchange to bilateral lung fields.  Abdominal:      General: Bowel sounds are normal.      Palpations: Abdomen is soft.   Musculoskeletal:         General: Normal range of motion.      Cervical back: Normal range of motion and neck supple.      Right lower leg: No edema.      Left lower leg: No edema.   Skin:     General: Skin is warm and dry.      Findings: Rash present. Rash is macular and papular.      Comments: Patient has maculopapular rash with diffuse lesions to the anterior and posterior trunk as well as bilateral upper extremities, lower portions.  Patient also has diffuse maculopapular lesions to the lower extremities, including the dorsal aspects of both feet.  There are no lesions noted in the webspaces of the feet or hands.  There are no significant lesions along the belt line under the nape of the neck.  Exam is not concerning for scabies, but patient wants to be treated for this.  No confluent erythema or induration or vesicles.   Neurological:      General: No focal deficit present.      Mental Status: She is alert and oriented to person, place, and time.      Cranial Nerves: Cranial nerves 2-12 are intact.      Sensory: Sensation is intact.      Motor: Motor function is intact.      Coordination: Coordination is intact.      Comments: Neuro intact and nonfocal         Procedures      "      ED Course  ED Course as of 05/05/23 2303   Fri May 05, 2023   2256 Patient has evidence of pruritic, maculopapular rash.  Differential diagnoses includes allergic dermatitis or contact dermatitis.  Patient is very concerned about scabies and wants to be treated for this.  She has no history of recurrent allergic reactions.  We will prescribe permethrin lotion, use as directed with 1 refill.  We also will prescribe Vistaril 25 mg by mouth every 6 hours as needed for itching, Pepcid 20 mg by mouth twice daily x7 days, as well as Medrol Dosepak as directed.  If symptoms of rash persist, we will give follow-up information for allergist.  Patient may benefit from allergy skin testing.  Patient is agreeable with above treatment plan. [FC]      ED Course User Index  [FC] Heidy Ndiaye, CLYDE           No results found for this or any previous visit (from the past 24 hour(s)).  Note: In addition to lab results from this visit, the labs listed above may include labs taken at another facility or during a different encounter within the last 24 hours. Please correlate lab times with ED admission and discharge times for further clarification of the services performed during this visit.    No orders to display     Vitals:    05/05/23 2042   BP: 116/68   BP Location: Left arm   Patient Position: Sitting   Pulse: 64   Resp: 16   Temp: 98.3 °F (36.8 °C)   TempSrc: Oral   SpO2: 100%   Weight: 56.7 kg (125 lb)   Height: 167.6 cm (66\")     Medications   famotidine (PEPCID) tablet 20 mg (has no administration in time range)   predniSONE (DELTASONE) tablet 40 mg (has no administration in time range)     ECG/EMG Results (last 24 hours)     ** No results found for the last 24 hours. **        No orders to display                                       MDM    Final diagnoses:   Allergic dermatitis   Pruritus   Tourette's   History of OCD (obsessive compulsive disorder)   History of IBS       ED Disposition  ED Disposition     ED " Disposition   Discharge    Condition   Stable    Comment   --             Kady Webster DO  [address] [address]  [phone]    Schedule an appointment as soon as possible for a visit in 2 days  Close PCP f/u for re-check    [facility]  [address]  [facility] [address]  [phone]  Call in 1 day  If rash persist, patient may need allergy skin testing    [facility] Emergency Department  [address] [facility] [address] [phone]    If symptoms worsen         Medication List      New Prescriptions    famotidine 20 MG tablet  Commonly known as: PEPCID  Take 1 tablet by mouth 2 (Two) Times a Day.     hydrOXYzine pamoate 25 MG capsule  Commonly known as: Vistaril  Take 1 capsule by mouth Every 6 (Six) Hours As Needed for Itching or Allergies.     methylPREDNISolone 4 MG dose pack  Commonly known as: MEDROL  Take as directed on package instructions.     permethrin 1 % lotion  Apply  topically to the appropriate area as directed 1 (One) Time for 1 dose. Shampoo, rinse and towel dry hair, saturate hair and scalp with permethrin. Rinse after 10 min; repeat in 1 week if needed        Stop    dicyclomine 10 MG capsule  Commonly known as: BENTYL     Sod Picosulfate-Mag Ox-Cit Acd 10-3.5-12 MG-GM -GM/160ML solution           Where to Get Your Medications      These medications were sent to [facility] [id number] - [facility] [address] [place], [place] [address]R[facility] [phone]  - [phone] [address] [address]    Phone: [phone]   · famotidine 20 MG tablet  · hydrOXYzine pamoate 25 MG capsule  · methylPREDNISolone 4 MG dose pack  · permethrin 1 % lotion          Heidy Ndiaye, PANesasC  05/05/23 7437

## 2023-05-06 NOTE — DISCHARGE INSTRUCTIONS
Patient having evidence of allergic dermatitis, but unknown etiology or new contact.  We we will treat patient with permethrin lotion to cover for scabies or body lice.  Use as directed with 1 refill and patient may repeat lotion in 1 week.  We also will prescribe Medrol Dosepak, Vistaril as needed for itching, and Pepcid 20 mg by mouth twice daily x7 days.  Recommend close PCP follow-up for recheck.  We also gave follow-up information for allergist here in [place].  If rash persist, patient may need allergy skin testing.  Return to the ER if worsening symptoms.

## 2023-05-11 ENCOUNTER — TELEMEDICINE (OUTPATIENT)
Dept: PSYCHIATRY | Facility: CLINIC | Age: 23
End: 2023-05-11
Payer: COMMERCIAL

## 2023-05-11 DIAGNOSIS — F33.1 MAJOR DEPRESSIVE DISORDER, RECURRENT EPISODE, MODERATE: ICD-10-CM

## 2023-05-11 DIAGNOSIS — F41.1 GENERALIZED ANXIETY DISORDER: Primary | ICD-10-CM
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
## 2023-05-31 ENCOUNTER — TELEMEDICINE (OUTPATIENT)
Dept: PSYCHIATRY | Facility: CLINIC | Age: 23
End: 2023-05-31

## 2023-05-31 DIAGNOSIS — F41.1 GENERALIZED ANXIETY DISORDER: Primary | ICD-10-CM

## 2023-05-31 DIAGNOSIS — F33.1 MAJOR DEPRESSIVE DISORDER, RECURRENT EPISODE, MODERATE: ICD-10-CM

## 2023-05-31 NOTE — PROGRESS NOTES
Date: May 31, 2023  Time In: 2:06 pm   Time Out: 2:51 pm   This provider is located at the [facility] [address], [place] [address] using a secure [employer] Video Visit through [employer]. Patient is being seen remotely via telehealth at home address in [place] and stated they are in a secure environment for this session. The patient's condition being diagnosed/treated is appropriate for telemedicine. The provider identified herself as well as her credentials. The patient, and/or patients guardian, consent to be seen remotely, and when consent is given they understand that the consent allows for patient identifiable information to be sent to a third party as needed. They may refuse to be seen remotely at any time. The electronic data is encrypted and password protected, and the patient and/or guardian has been advised of the potential risks to privacy not withstanding such measures.     You have chosen to receive care through a telehealth visit.  Do you consent to use a video/audio connection for your medical care today? Yes    PROGRESS NOTE  Data:  Marilee Cooper is a 22 y.o. female who presents today for follow up    Chief Complaint: anxiety, depression    History of Present Illness: Patient updated therapist on life events since last contact. Patient reported that she signed her new lease for her apartment. Patient reported that it's a luxury complex with a pool and gym. Patient reported being excited but also stressed about the move. Patient reported that she got a ticket to [place] and that she got an aid ticket with parking pass. Patient processed that she plans to save money in order to cover the other costs. Patient reported that she also plans to get a second job which will be helpful. Patient reported that during a weekend out she ran into her ex boyfriend and that they saw each other in passing. Patient explored thoughts and  feelings regarding the situation and how the interaction/lack of occurred.Patient was able to share worries and concerns that are present about the upcoming transitions and changes that are coming. Patient reflected on past relationships. Patient identified the things that she took away from the relationship and what she looks for in new relationships. Patient also discussed friendships and those that are continuing to evolve.     Clinical Maneuvering/Intervention:person centered, transfer of care    (Scales based on 0 - 10 with 10 being the worst)  Depression:  4 Anxiety:  5-6       Assisted patient in processing above session content; acknowledged and normalized patient’s thoughts, feelings, and concerns.  Rationalized patient thought process regarding being able to identify the things that patient wants. Patient reported that she wants to be happy and explore more about herself. Patient explored and shared about how self perception has been difficult to the past due to interactions from others. Discussed triggers associated with patient's self esteem.  Patient discussed ways in which she has learned to cope with situations. Patient will continue to learn and explore effective coping skills.     Allowed patient to freely discuss issues without interruption or judgment. Provided safe, confidential environment to facilitate the development of positive therapeutic relationship and encourage open, honest communication. Assisted patient in identifying risk factors which would indicate the need for higher level of care including thoughts to harm self or others and/or self-harming behavior and encouraged patient to contact this office, call 911, or present to the nearest emergency room should any of these events occur. Patient adamantly and convincingly denies current suicidal or homicidal ideation or perceptual disturbance.    Assessment:   Assessment   Patient appears to maintain relative stability as compared to their  "baseline.  However, patient continues to struggle with anxiety, depression which continues to cause impairment in important areas of functioning.  A result, they can be reasonably expected to continue to benefit from treatment and would likely be at increased risk for decompensation otherwise.    Mental Status Exam:   Hygiene:   Conducted via phone due to connectivity issues with video/speaker  Cooperation:  Cooperative  Eye Contact:  Conducted via phone due to connectivity issues with video/speaker  Psychomotor Behavior:  \"\"  Affect:  Appropriate  Mood: normal  Speech:  Normal  Thought Process:  Linear  Thought Content:  Mood congruent  Suicidal:  Patient stated that after she saw her ex having feelings of not being able to escape it. Patient denied intent or plan.   Homicidal:  None  Hallucinations:  None  Delusion:  None  Memory:  Intact  Orientation:  Person, Place, Time and Situation  Reliability:  good  Insight:  Good  Judgement:  Good  Impulse Control:  Good  Physical/Medical Issues:  No        Patient's Support Network Includes:  parents, extended family and friends    Functional Status: Moderate impairment     Progress toward goal: Not at goal    Prognosis: Good with Ongoing Treatment          Plan:  Patient has no further appointments scheduled with current therapist. Therapist will be leaving the practice on 5/31. Patient has a transfer of care appointment scheduled with Essence Jarrell LCSW on 6/19/23. Patient will continue in individual outpatient therapy with focus on improved functioning and coping skills, maintaining stability, and avoiding decompensation and the need for higher level of care.    Patient will adhere to medication regimen as prescribed and report any side effects. Patient will contact this office, call 911 or present to the nearest emergency room should suicidal or homicidal ideations occur.            VISIT DIAGNOSIS:     ICD-10-CM ICD-9-CM   1. Generalized anxiety disorder  F41.1 300.02 "   2. Major depressive disorder, recurrent episode, moderate  F33.1 296.32             This document has been electronically signed by NICO Lobato  May 31, 2023 14:52 EDT      Part of this note may be an electronic transcription/translation of spoken language to printed text using the Dragon Dictation System.